# Patient Record
Sex: FEMALE | Race: WHITE
[De-identification: names, ages, dates, MRNs, and addresses within clinical notes are randomized per-mention and may not be internally consistent; named-entity substitution may affect disease eponyms.]

---

## 2017-01-01 ENCOUNTER — HOSPITAL ENCOUNTER (EMERGENCY)
Dept: HOSPITAL 62 - ER | Age: 22
Discharge: HOME | End: 2017-01-01
Payer: COMMERCIAL

## 2017-01-01 VITALS — SYSTOLIC BLOOD PRESSURE: 110 MMHG | DIASTOLIC BLOOD PRESSURE: 67 MMHG

## 2017-01-01 DIAGNOSIS — O21.2: Primary | ICD-10-CM

## 2017-01-01 DIAGNOSIS — Z3A.00: ICD-10-CM

## 2017-01-01 DIAGNOSIS — E86.0: ICD-10-CM

## 2017-01-01 DIAGNOSIS — O99.283: ICD-10-CM

## 2017-01-01 DIAGNOSIS — O26.893: ICD-10-CM

## 2017-01-01 DIAGNOSIS — R23.1: ICD-10-CM

## 2017-01-01 DIAGNOSIS — R10.12: ICD-10-CM

## 2017-01-01 LAB
%HYPO/RBC NFR BLD AUTO: SLIGHT %
ALBUMIN SERPL-MCNC: 3.7 G/DL (ref 3.5–5)
ALP SERPL-CCNC: 75 U/L (ref 38–126)
ALT SERPL-CCNC: 25 U/L (ref 9–52)
ANION GAP SERPL CALC-SCNC: 13 MMOL/L (ref 5–19)
APPEARANCE UR: (no result)
AST SERPL-CCNC: 17 U/L (ref 14–36)
BASOPHILS NFR BLD MANUAL: 0 % (ref 0–2)
BILIRUB DIRECT SERPL-MCNC: 0 MG/DL (ref 0–0.3)
BILIRUB SERPL-MCNC: 0.5 MG/DL (ref 0.2–1.3)
BILIRUB UR QL STRIP: NEGATIVE
BUN SERPL-MCNC: 10 MG/DL (ref 7–20)
CALCIUM: 8.9 MG/DL (ref 8.4–10.2)
CHLORIDE SERPL-SCNC: 106 MMOL/L (ref 98–107)
CO2 SERPL-SCNC: 22 MMOL/L (ref 22–30)
CREAT SERPL-MCNC: 0.6 MG/DL (ref 0.52–1.25)
EOSINOPHIL NFR BLD MANUAL: 0 % (ref 0–6)
ERYTHROCYTE [DISTWIDTH] IN BLOOD BY AUTOMATED COUNT: 13 % (ref 11.5–14)
GLUCOSE SERPL-MCNC: 86 MG/DL (ref 75–110)
GLUCOSE UR STRIP-MCNC: >=500 MG/DL
HCT VFR BLD CALC: 38.9 % (ref 36–47)
HGB BLD-MCNC: 12.9 G/DL (ref 12–15.5)
HGB HCT DIFFERENCE: -0.2
KETONES UR STRIP-MCNC: 80 MG/DL
LIPASE SERPL-CCNC: 78.7 U/L (ref 23–300)
MCH RBC QN AUTO: 26.7 PG (ref 27–33.4)
MCHC RBC AUTO-ENTMCNC: 33.1 G/DL (ref 32–36)
MCV RBC AUTO: 81 FL (ref 80–97)
NEUTS BAND NFR BLD MANUAL: 2 % (ref 3–5)
NITRITE UR QL STRIP: NEGATIVE
PH UR STRIP: 5 [PH] (ref 5–9)
POTASSIUM SERPL-SCNC: 4.2 MMOL/L (ref 3.6–5)
PROT SERPL-MCNC: 6.3 G/DL (ref 6.3–8.2)
PROT UR STRIP-MCNC: NEGATIVE MG/DL
RBC # BLD AUTO: 4.83 10^6/UL (ref 3.72–5.28)
SODIUM SERPL-SCNC: 140.9 MMOL/L (ref 137–145)
SP GR UR STRIP: 1.02
TOTAL CELLS COUNTED BLD: 100
UROBILINOGEN UR-MCNC: NEGATIVE MG/DL (ref ?–2)
VARIANT LYMPHS NFR BLD MANUAL: 2 % (ref 13–45)
WBC # BLD AUTO: 13.5 10^3/UL (ref 4–10.5)

## 2017-01-01 PROCEDURE — 85025 COMPLETE CBC W/AUTO DIFF WBC: CPT

## 2017-01-01 PROCEDURE — 96375 TX/PRO/DX INJ NEW DRUG ADDON: CPT

## 2017-01-01 PROCEDURE — 80053 COMPREHEN METABOLIC PANEL: CPT

## 2017-01-01 PROCEDURE — 96374 THER/PROPH/DIAG INJ IV PUSH: CPT

## 2017-01-01 PROCEDURE — 83690 ASSAY OF LIPASE: CPT

## 2017-01-01 PROCEDURE — 99284 EMERGENCY DEPT VISIT MOD MDM: CPT

## 2017-01-01 PROCEDURE — 36415 COLL VENOUS BLD VENIPUNCTURE: CPT

## 2017-01-01 PROCEDURE — 96361 HYDRATE IV INFUSION ADD-ON: CPT

## 2017-01-01 PROCEDURE — 81001 URINALYSIS AUTO W/SCOPE: CPT

## 2017-01-01 NOTE — ER DOCUMENT REPORT
ED Medical Screen (RME)





- General


Chief Complaint: Flu Symptoms


Stated Complaint: UPPER ABDOMINAL PAIN W/PREGNANCY


Mode of Arrival: Ambulatory


Information source: Patient


Notes: 


Patient is currently 7 months pregnant and complains of nausea and vomiting 

that started today.  Patient is .  Patient reports left upper quadrant 

abdominal pain.  Patient denies any lower abdominal pain.  Patient denies any 

fever, urinary symptoms, vaginal bleeding or discharge.


TRAVEL OUTSIDE OF THE [place] IN LAST 30 DAYS: No





- Related Data


Allergies/Adverse Reactions: 


 





No Known Allergies Allergy (Unverified 17 18:31)


 











Physical Exam





- Vital signs


Vitals: 





 











Temp Pulse Resp BP Pulse Ox


 


 98.0 F   120 H  16   129/67 H  100 


 


 17 17:12  17 17:12  17 17:12  17 17:12  17 17:12














- Abdominal


Inspection: Gravid female, Other - Left upper quadrant





Course





- Vital Signs


Vital signs: 





 











Temp Pulse Resp BP Pulse Ox


 


 98.0 F   120 H  16   129/67 H  100 


 


 17 17:12  17 18:30  17 17:12  17 17:12  17 17:12

## 2017-01-01 NOTE — ER DOCUMENT REPORT
ED GI/





- General


Mode of Arrival: Ambulatory


Information source: Patient


TRAVEL OUTSIDE OF THE [place] IN LAST 30 DAYS: No





- HPI


Patient complains to provider of: Abdominal pain - left side, Vomiting


Onset: This afternoon - 13:00


Location: Other - see above


Associated symptoms: Other - see above





<BALTAZAR HAMILTON - Last Filed: 17 20:07>





<MARJBEARTUR - Last Filed: 17 23:19>





- General


Chief Complaint: Flu Symptoms


Stated Complaint: UPPER ABDOMINAL PAIN W/PREGNANCY


Notes: 


21 year old pregnant female (7 months) presents to the ED complaining of 

constant vomiting since 13:00 this afternoon. Patient states that she has 

associated left sided abdominal pain prior to vomiting. Patient explains that 1 

week ago she had similar symptoms with the associated pain. Patient is vomiting 

every 30 minutes, stating that she is able to hold down water for approximately 

30 minutes before vomiting. Mother states that the patient looks more pale than 

usual. Patient denies diarrhea, coughing, or fever. Patient denies any surgical 

history and states that she takes pre-kari vitamins, but is not regular about 

them. (BALTAZAR HAMILTON)





- Related Data


Allergies/Adverse Reactions: 


 





No Known Allergies Allergy (Unverified 17 18:31)


 











Past Medical History





- General


Information source: Patient





- Social History


Smoking Status: Never Smoker


Frequency of alcohol use: None


Family History: Reviewed & Not Pertinent





- Medical History


Medical History: Negative


Surgical Hx: Negative





<BALTAZAR HAMILTON - Last Filed: 17 20:07>





Review of Systems





- Review of Systems


Constitutional: No symptoms reported.  denies: Fever


EENT: No symptoms reported


Cardiovascular: No symptoms reported


Respiratory: No symptoms reported.  denies: Cough


Gastrointestinal: See HPI, Abdominal pain - left side, Vomiting.  denies: 

Diarrhea


Genitourinary: No symptoms reported


Female Genitourinary: See HPI, Pregnant - 7 months


Musculoskeletal: No symptoms reported


Skin: See HPI, Change in color - pale


Hematologic/Lymphatic: No symptoms reported


Neurological/Psychological: No symptoms reported





<BALTAZAR HAMILTON - Last Filed: 17 20:07>





Physical Exam





- Vital signs


Interpretation: Normal





- General


General appearance: Alert


In distress: None





- HEENT


Head: Normocephalic, Atraumatic


Eyes: Normal


Extraocular movements intact: Yes


Pupils: PERRL





- Respiratory


Respiratory status: No respiratory distress


Breath sounds: Normal





- Cardiovascular


Rhythm: Regular


Heart sounds: Normal auscultation





- Abdominal


Inspection: Normal, Gravid female - 7 months


Distension: No distension


Tenderness: Tender - mild LUQ tenderness to palpation


Organomegaly: No organomegaly





- Back


Back: Normal





- Extremities


General upper extremity: Normal inspection, Normal ROM


General lower extremity: Normal inspection, Normal ROM





- Neurological


Neuro grossly intact: Yes





- Psychological


Associated symptoms: Normal affect, Normal mood





- Skin


Skin Temperature: Warm


Skin Moisture: Dry


Skin Color: Normal





<BALTAZAR HAMILTON - Last Filed: 17 20:07>





<ARTUR MCMAHAN - Last Filed: 17 23:19>





- Vital signs


Vitals: 


 











Temp Pulse Resp BP Pulse Ox


 


 98.0 F   120 H  16   129/67 H  100 


 


 17 17:12  17 17:12  17 17:12  17 17:12  17 17:12








 (BALTAZAR HAMILTON)


 (ARTUR MCMAHAN)





Course





- Laboratory


Result Diagrams: 


 17 19:10





 17 19:10





<BALTAZAR HAMILTON - Last Filed: 17 20:07>





- Laboratory


Result Diagrams: 


 17 19:10





 17 19:10





<ARTUR MCMAHAN - Last Filed: 17 23:19>





- Re-evaluation


Re-evalutation: 


17 22:55


The patient's urine is clean, shows large amount of ketones.  The elevated 

white blood cell count is probably due to all the nausea and vomiting and 

dehydration.  There is no significant abdominal pain on exam or by history.  

There is no fever.  There is no cough or URI symptoms.  There is no abdominal 

or pelvic cramping or bleeding.


At this time the patient states her nauseousness is much better even though she 

declined all nausea medication.  She is requesting some ginger ale to drink.














17 23:19


The patient drank ginger ale, tolerated it well, is anxious to go home. (ARTUR MCMAHAN)





- Vital Signs


Vital signs: 


 











Temp Pulse Resp BP Pulse Ox


 


 98.2 F   99   24 H  102/58 L  96 


 


 17 19:19  17 19:19  17 22:31  17 22:31  17 22:31








 (BALTAZAR HMAILTON)


 (ARTUR MCMAHAN)





- Laboratory


Laboratory results interpreted by me: 


 











  17





  19:10 21:58


 


WBC  13.5 H 


 


MCH  26.7 L 


 


Seg Neuts % (Manual)  92 H 


 


Band Neutrophils %  2 L 


 


Lymphocytes % (Manual)  2 L 


 


Abs Neuts (Manual)  12.7 H 


 


Abs Lymphs (Manual)  0.4 L 


 


Urine Glucose (UA)   >=500 H


 


Urine Ketones   80 H


 


Urine Ascorbic Acid   40 H











 (ARTUR MCMAHAN)





Discharge





<BALTAZAR HAMILTON - Last Filed: 17 20:07>





<ARTUR MCMAHAN - Last Filed: 17 23:19>





- Discharge


Clinical Impression: 


 Dehydration, Third trimester pregnancy at less than 36 weeks





Nausea & vomiting


Qualifiers:


 Vomiting type: unspecified Vomiting Intractability: non-intractable Qualified 

Code(s): R11.2 - Nausea with vomiting, unspecified





Condition: Stable


Disposition: HOME, SELF-CARE


Additional Instructions: 


Nausea or Vomiting, Nonspecific:





     Vomiting (or nausea without vomiting) can be caused by many different 

problems. Of course, it can mean that something's wrong with the stomach, such 

as "stomach flu," ulcers, or inflammation. But it can also be a symptom of a 

problem that has nothing to do with the stomach or intestines. Vomiting is 

common with severe headaches, earaches, and tonsillitis. We see it with 

pneumonia or heart attacks. Drugs can cause nausea. Many abdominal problems 

cause vomiting; for example, gallstones, kidney stones, pancreatitis, and 

intestinal obstruction (blocked bowels).


     In most cases, curing the vomiting depends on fixing the problem that 

caused it. For temporary relief, we may use an anti-nausea medicine. For home 

use, we can prescribe suppositories, chewable pills, pills that dissolve in the 

mouth, or liquid anti-nausea drugs. If the vomiting seems to be caused by a 

problem in the stomach, acid-suppressing drugs may be prescribed as well.


     It's important to avoid dehydration. Sip clear liquids. Take increasing 

amounts of fluid over the first 24 hours. Then start small amounts of bland 

foods (such as dry toast, applesauce, mashed potato). Avoid aspirin, tobacco, 

and alcohol. Gradually resume your usual diet.


     If the vomiting worsens, if the problem that's making you vomit worsens, 

or if there's evidence of bleeding in the stomach (such as black, tarry stool, 

bloody or black vomit, or lightheadedness), you should return immediately. Call 

your doctor if you aren't improved in 24 to 36 hours.











TAKE THE MEDICATION AS PRESCRIBED FOR NAUSEA IF NEEDED.


DRINK SMALL SIPS OF COOL CLEAR LIQUIDS TODAY.


FOLLOW UP WITH YOUR DOCTOR THIS WEEK FOR RECHECK IF NOT IMPROVING.





RETURN TO THE EMERGENCY ROOM IF ANY NEW OR WORSENING SYMPTOMS.








Prescriptions: 


Promethazine HCl [Phenergan 25 mg Tablet] 25 mg PO Q4 PRN #12 tablet


 PRN Reason: For Nausea/Vomiting


Referrals: 


ANIKET CERON MD [Primary Care Provider] - Follow up in 3-5 days


Lauraibe Attestation: 





17 23:13


I personally performed the services described in the documentation, reviewed 

and edited the documentation which was dictated to the scribe in my presence, 

and it accurately records my words and actions. (ARTUR MCMAHAN)





Scribe Documentation





- Scribe


Written by Inderjit:: Inderjit Rodriguez, 2017 19:53


acting as scribe for :: Marj





<BALTAZAR HAMILTON - Last Filed: 17 20:07>

## 2017-02-15 ENCOUNTER — HOSPITAL ENCOUNTER (OUTPATIENT)
Dept: HOSPITAL 62 - LC | Age: 22
Discharge: HOME | End: 2017-02-15
Attending: OBSTETRICS & GYNECOLOGY
Payer: MEDICAID

## 2017-02-15 DIAGNOSIS — Z3A.34: ICD-10-CM

## 2017-02-15 DIAGNOSIS — O47.03: Primary | ICD-10-CM

## 2017-02-15 LAB
APPEARANCE UR: (no result)
BARBITURATES UR QL SCN: NEGATIVE
BILIRUB UR QL STRIP: NEGATIVE
GLUCOSE UR STRIP-MCNC: NEGATIVE MG/DL
KETONES UR STRIP-MCNC: 20 MG/DL
METHADONE UR QL SCN: NEGATIVE
NITRITE UR QL STRIP: NEGATIVE
PCP UR QL SCN: NEGATIVE
PH UR STRIP: 6 [PH] (ref 5–9)
PROT UR STRIP-MCNC: NEGATIVE MG/DL
SP GR UR STRIP: 1
URINE OPIATES LOW: NEGATIVE
UROBILINOGEN UR-MCNC: NEGATIVE MG/DL (ref ?–2)

## 2017-02-15 PROCEDURE — 81001 URINALYSIS AUTO W/SCOPE: CPT

## 2017-02-15 PROCEDURE — 82962 GLUCOSE BLOOD TEST: CPT

## 2017-02-15 PROCEDURE — 4A1HXCZ MONITORING OF PRODUCTS OF CONCEPTION, CARDIAC RATE, EXTERNAL APPROACH: ICD-10-PCS | Performed by: OBSTETRICS & GYNECOLOGY

## 2017-02-15 PROCEDURE — 59025 FETAL NON-STRESS TEST: CPT

## 2017-02-15 PROCEDURE — 80307 DRUG TEST PRSMV CHEM ANLYZR: CPT

## 2017-02-15 NOTE — NON STRESS TEST REPORT
=================================================================

Non Stress Test

=================================================================

Datetime Report Generated by CPN: 02/15/2017 17:11

   

   

=================================================================

DEMOGRAPHIC

=================================================================

   

EGA NST:  34.4

   

=================================================================

INDICATION

=================================================================

   

Indication for Study:  Other

Indication for Study (NST) Other:  LC

   

=================================================================

MONITORING

=================================================================

   

Monitor Explained:  Monitor Explained; Test Explained; Patient

   Verbalized Understanding

Time on Monitor:  02/15/2017 15:37

Time off Monitor:  02/15/2017 16:24

NST Duration:  47

   

=================================================================

NST INTERVENTIONS

=================================================================

   

NST Interventions:  PO Hydration

Physician Notified NST:  A. Emmel CNM

BABY A:  [id number]

   

=================================================================

BABY A

=================================================================

   

Fetal Movement :  Present

Contraction Frequency :  rare

FHR Baseline :  125

Accelerations :  15X15

Decelerations :  None

Variability :  Moderate 6-25bpm

NST Review:  Meets Criteria for Reactive NST

NST Review and Verified By :  ANU Doe RN

NST Results:  Reactive

   

=================================================================

NST REPORT

=================================================================

   

Report Trigger:  Send Report

## 2017-02-15 NOTE — L&D FLOW SHEET
=================================================================

LD Flowsheet

=================================================================

Datetime Report Generated by CPN: 02/15/2017 16:00

   

   

=================================================================

Datetime: 02/15/2017 15:46

=================================================================

   

 Bedside Blood Glucose:  90 (Geneva Marlatt, RN)

   

=================================================================

Datetime: 02/15/2017 15:41

=================================================================

   

   

=================================================================

Communication

=================================================================

   

 Communication:  Provider Orders Received; Call/Page Placed to Provider

   (Geneva Christianson RN)

 Provider Notified (Name):  ANU Thompson ELKIN (Geneva Christianson RN)

 Notification Reason:  Status Update; Fetal Status; Membrane Status;

   Uterine Activity; Maternal Vital Sign Change (Geneva Christianson RN)

 Communication Comments:  Notified provider of patient's complaints of 

   abdominal pain, dizziness, and cold sweats after stnading up to help

   a patient at work and the fact that pt hasn't eaten since 8am but at

   crackers on the way here.  Received order to check bedside glucose

   and give her crackers and peanut butter. (Geneva Christianson RN)

   

=================================================================

Datetime: 02/15/2017 15:40

=================================================================

   

   

=================================================================

Maternal Assessment

=================================================================

   

 Level of Consciousness:  Fully Conscious (Geneva Slaterlatalisha, RN)

 DTR's/Clonus:  DTRs 2+; No Clonus (Geneva Marlatalisha, RN)

 Headache:  Denies (Geneva Christianson, RN)

 Breath Sounds, Left:  Clear and Equal (Geneva Slaterlatalisha, RN)

 Breath Sounds, Right:  Clear and Equal (Geneva Jennifertalisha, RN)

 Nausea/Vomiting:  Denies (Geneva Marlatalisha RN)

 RUQ Epigastric Pain:  Denies (Geneva Christianson, RN)

   

=================================================================

Datetime: 02/15/2017 15:35

=================================================================

   

   

=================================================================

Vital Signs

=================================================================

   

 NBP Sys/Zandra/Mean (mmHg):  94 (QS system process)

:  57 (QS system process)

:  68 (QS system process)

 Pulse:  74 (QS system process)

## 2017-03-23 ENCOUNTER — HOSPITAL ENCOUNTER (OUTPATIENT)
Dept: HOSPITAL 62 - LC | Age: 22
Discharge: HOME | End: 2017-03-23
Attending: OBSTETRICS & GYNECOLOGY
Payer: MEDICAID

## 2017-03-23 DIAGNOSIS — O47.1: Primary | ICD-10-CM

## 2017-03-23 DIAGNOSIS — Z3A.39: ICD-10-CM

## 2017-03-23 LAB
APPEARANCE UR: (no result)
BARBITURATES UR QL SCN: NEGATIVE
BILIRUB UR QL STRIP: NEGATIVE
GLUCOSE UR STRIP-MCNC: NEGATIVE MG/DL
KETONES UR STRIP-MCNC: NEGATIVE MG/DL
METHADONE UR QL SCN: NEGATIVE
NITRITE UR QL STRIP: NEGATIVE
PCP UR QL SCN: NEGATIVE
PH UR STRIP: 6 [PH] (ref 5–9)
PROT UR STRIP-MCNC: 30 MG/DL
SP GR UR STRIP: 1.01
URINE OPIATES LOW: NEGATIVE
UROBILINOGEN UR-MCNC: NEGATIVE MG/DL (ref ?–2)

## 2017-03-23 PROCEDURE — 59025 FETAL NON-STRESS TEST: CPT

## 2017-03-23 PROCEDURE — 81005 URINALYSIS: CPT

## 2017-03-23 PROCEDURE — 4A1HXCZ MONITORING OF PRODUCTS OF CONCEPTION, CARDIAC RATE, EXTERNAL APPROACH: ICD-10-PCS | Performed by: OBSTETRICS & GYNECOLOGY

## 2017-03-23 PROCEDURE — 80307 DRUG TEST PRSMV CHEM ANLYZR: CPT

## 2017-03-23 NOTE — L&D DISCHARGE SUMMARY
=================================================================

OB Discharge Summary

=================================================================

Datetime Report Generated by CPN: 03/23/2017 10:45

   

   

=================================================================

DISCHARGE DIAGNOSIS

=================================================================

   

Diagnosis/Symptoms:  False Labor

Diagnoses/Symptoms Other:  IUP at 39.5 weeks, reactive NST, normal

   bloody show, not in labor

Gestation:  39.5

Number of Babies in Womb:  1

Parity:  0

   

=================================================================

DIET/ACTIVITY/RESTRICTIONS

=================================================================

   

Diet:  Regular

Activity:  Normal Activity

   

=================================================================

TEACHING/INSTRUCTIONS/REFERRALS

=================================================================

   

Instructions Given To:  Patient

Instructions Understood:  Patient Verbalized Understanding; Support

   Person Verbalized Understanding

Referrals:  None

Educational Materials- Other:  term labor, the labor process

   

=================================================================

DISCHARGE INFORMATION

=================================================================

   

Discharged AMA:  No

Discharge Date/Time:  03/23/2017 09:16

Discharged To:  Home

Discharge Provider Name:  LUCIEN Grider CNM

Accompanied By:  

Discharge Method:  Ambulatory

Condition:  Stable

   

=================================================================

FOLLOW UP INFORMATION

=================================================================

   

Follow Up With:  [facility]

Follow Up On:  As Scheduled

Follow Up Phone Number:  [facility] - [phone]

Comments:  Instructed  patient to eat small frequent meals to avoid

   feeling like that again. Instructed patient on how to do Kick

   Counts. Patient verbalized understanding and denied any questions.

## 2017-03-23 NOTE — ANTEPARTUM DISCHARGE SUMMARY
=================================================================

Antepartum DC

=================================================================

Datetime Report Generated by CPN: 03/23/2017 10:45

   

   

=================================================================

DIET/ACTIVITY/RESTRICTIONS

=================================================================

   

Diet:  Regular    (03/23/2017 09:00:ERLINDA Reinoso)

Activity:  Normal Activity    (03/23/2017 09:00:Cherelle Huynh RNC)

   

=================================================================

TEACHING/INSTRUCTIONS/REFERRALS

=================================================================

   

Instructions Given To:  Patient    (03/23/2017 09:00:ERLINDA Reinoso)

Instructions Understood:  Patient Verbalized Understanding; Support

   Person Verbalized Understanding    (03/23/2017 09:00:ERLINDA Reinoso)

Referrals:  None    (03/23/2017 09:00:ERLINDA Reinoso)

Educational Materials- Other:  term labor, the labor process   

   (03/23/2017 09:00:ERLINDA Reinoso)

   

=================================================================

DISCHARGE INFORMATION

=================================================================

   

Discharged AMA:  No    (03/23/2017 09:00:ERLINDA Reinoso)

Discharge Date/Time:  03/23/2017 09:16    (03/23/2017 09:00:ERLINDA Reinoso)

Discharged To:  Home    (03/23/2017 09:00:ERLINDA Reinoso)

Discharge Provider Name:  LUCIEN Grider CNM    (03/23/2017 09:00:ERLINDA Reinoso)

Accompanied By:      (03/23/2017 09:00:ERLINDA Reinoso)

Discharge Method:  Ambulatory    (03/23/2017 09:00:ERLINDA Reinoso)

Condition:  Stable    (03/23/2017 09:00:ERLINDA Reinoso)

   

=================================================================

FOLLOW UP INFORMATION

=================================================================

   

Follow Up With:  [facility]    (03/23/2017

   09:00:ERLINDA Reinoso)

Follow Up On:  As Scheduled    (03/23/2017 09:00:ERLINDA Reinoso)

Follow Up Phone Number:  [facility] - [phone]

   (03/23/2017 09:00:ERLINDA Reinoso)

## 2017-03-23 NOTE — L&D CURRENT ADMISSION
=================================================================

Current Admit

=================================================================

Datetime Report Generated by CPN: 03/23/2017 10:45

   

   

=================================================================

ADMISSION INFORMATION

=================================================================

   

Chief Complaint:  Vaginal Bleeding    (03/23/2017 08:37:ERLINDA Reinoso)

Chief Complaint:  Dizziness; Other    (02/15/2017 15:40:Geneva Christianson RN)

## 2017-03-23 NOTE — L&D ADMISSION ASSESSMENT
=================================================================

LD ADM ASMT

=================================================================

Datetime Report Generated by CPN: 03/23/2017 10:45

   

   

=================================================================

PATIENT ASSESSMENT

=================================================================

   

Assessment Type:  Triage    (03/23/2017 08:37:Cherellegertrudis Huynh, RNC)

   

=================================================================

WEIGHT

=================================================================

   

Weight (lb):  255    (03/23/2017 08:25:QS system process)

Weight (kg):  115.9    (03/23/2017 08:25:QS system process)

BMI:  41.2    (03/23/2017 08:25:QS system process)

   

=================================================================

PAIN

=================================================================

   

Pain Scale:  0    (03/23/2017 08:37:ERLINDA Reinoso)

   

=================================================================

NEURO

=================================================================

   

Level of Consciousness:  Fully Conscious    (03/23/2017 08:37:ERLINDA Reinoso)

DTR's/Clonus:  DTRs 2+; No Clonus    (03/23/2017 08:37:Cherelle Huynh RN)

Headache:  Denies    (03/23/2017 08:37:ERLINDA Reinoso)

Dizziness:  No    (03/23/2017 08:37:ERLINDA Reinoso)

Blurred Vision:  No    (03/23/2017 08:37:Cherelle Huynh RN)

Extremity Numbness/Tingling :  None    (03/23/2017 08:37:ERLINDA Reinoso)

Extremity Movement:  Full Range of Motion    (03/23/2017

   08:37:Cherelle Huynh RN)

   

=================================================================

CARDIOVASCULAR

=================================================================

   

Heart Rhythm:  Regular    (03/23/2017 08:37:ERLINDA Reinoso)

Nailbeds:  Pink    (03/23/2017 08:37:ERLINDA Reinoso)

Capillary Refill:  Less than 3 Seconds    (03/23/2017 08:37:ERLINDA Reinoso)

Lower Extremities Edema:  None    (03/23/2017 08:37:ERLINDA Reinoso)

Lower Extremities Edema Degree:  None    (03/23/2017 08:37:ERLINDA Reinoso)

Upper Extremities Edema:  None    (03/23/2017 08:37:ERLINDA Reinoso)

Upper Extremities Edema Degree:  None    (03/23/2017 08:37:ERLINDA Reinoso)

Facial Edema:  None    (03/23/2017 08:37:ERLINDA Reinoso)

   

=================================================================

RESPIRATORY

=================================================================

   

Respiratory Effort:  Unlabored; Regular Rhythm; Equal Expansion   

   (03/23/2017 08:37:ERLINDA Reinoso)

Breath Sounds, Left:  Clear and Equal    (03/23/2017 08:37:ERLINDA Reinoso)

Breath Sounds, Right:  Clear and Equal    (03/23/2017 08:37:ERLINDA Reinoso)

Cough Productivity:  None    (03/23/2017 08:37:ERLINDA Reinoso)

   

=================================================================

GASTROINTESTINAL

=================================================================

   

Nausea/Vomiting:  Denies    (03/23/2017 08:37:ERLINDA Reinoso)

Bowel Sounds:  Normoactive; All Quadrants    (03/23/2017

   08:37:ERLINDA Reinoso)

RUQ Epigastric Pain:  Denies    (03/23/2017 08:37:ERLINDA Reinoso)

   

=================================================================

GENITOURINARY

=================================================================

   

Bladder:  Nondistended    (03/23/2017 08:37:ERLINDA Reinoso)

Frequency of Urination:  No    (03/23/2017 08:37:ERLINDA Reinoso)

Urination Burning:  No    (03/23/2017 08:37:ERLINDA Reinoso)

CVA Tenderness:  No    (03/23/2017 08:37:ERLINDA Reinoso)

Vaginal Bleeding:  None    (03/23/2017 08:37:ERLINDA Reinoso)

Vaginal Discharge Color:  N/A    (03/23/2017 08:37:ERLINDA Reinoso)

   

=================================================================

INTEGUMENTARY

=================================================================

   

Skin Color:  Normal for Race    (03/23/2017 08:37:ERLINDA Reinoso)

Skin Temperature:  Warm    (03/23/2017 08:37:ERLINDA Reinoso)

Skin Moisture:  Dry    (03/23/2017 08:37:ERLINDA Reinoso)

Body Piercings/Tattoos:  Tongue, ears, and belly button pierced   

   (03/23/2017 08:37:ERLINDA Reinoso)

   

=================================================================

JOLENE SKIN ASSESSMENT

=================================================================

   

Jolene Scale Sensory Perception:  No Impairment- Responds to verbal

   commands.  Has no sensory deficit which would limit ability to feel

   or voice pain or discomfort    (03/23/2017 08:37:ERLINDA Reinoso)

Jolene Scale Moisture:  Rarely Moist- Skin is usually dry.  Linen only

   requires changing at routine intervals    (03/23/2017

   08:37:ERLINDA Reinoso)

Jolene Scale Activity:  Walks Frequently- Walks outside the room at

   least twice a day and inside room at least every 2 hours during the

   day.    (03/23/2017 08:37:ERLINDA Reinoso)

Jolene Scale Mobility:  No Limitations- Makes major and frequent

   changes in position without assistance    (03/23/2017

   08:37:ERLINDA Reinoso)

Jolene Scale Nutrition:  Excellent- Eats most of every meal.  Never

   refuses a meal.  Usually eats a total of 4 or more servings of meat

   and dairy products.  Occasionally eats between meals.  Does not

   require supplementation    (03/23/2017 08:37:ERLINDA Reinoso)

Jolene Scale Friction and Shear:  No Apparent Problem- Moves in bed and

   in chair independently and has sufficient muscle strength to lift up

   completely during move.  Maintains good position in bed or chair at

   all times    (03/23/2017 08:37:ERLINDA Reinoso)

Jolene Scale Total:  23    (03/23/2017 08:37:QS system process)

Jolene Scale Risk:  No Risk of Pressure Ulcer Noted at this Time   

   (03/23/2017 08:37:QS system process)

   

=================================================================

SUPPORT

=================================================================

   

Family Support:  Significant Other supportive, at bedside frequently   

   (03/23/2017 08:37:ERLINDA Reinoso)

Emotional State:  Calm/Relaxed    (03/23/2017 08:37:ERLINDA Reinoso)

   

=================================================================

SAFETY

=================================================================

   

Call Mishra Within Reach:  Yes    (03/23/2017 08:37:ERLINDA Reinoso)

Side Rails Up:  Yes    (03/23/2017 08:37:ERLINDA Reinoso)

Bed Wheels Locked:  Yes    (03/23/2017 08:37:ERLINDA Reinoso)

Arm Bands Present:  Yes    (03/23/2017 08:37:ERLINDA Reinoso)

   

=================================================================

FALL SCREEN

=================================================================

   

Fall Risk History of Falling:  (0) No    (03/23/2017 08:37:ERLINDA Reinoso)

Fall Risk Secondary Diagnosis:  (0) No    (03/23/2017 08:37:ERLINDA Reinoso)

Fall Risk Ambulatory Aid:  (0) None/Bedrest/Wheelchair/Nurse Assist   

   (03/23/2017 08:37:ERLINDA Reinoso)

Fall Risk IV Therapy:  (0) No    (03/23/2017 08:37:ERLINDA Reinoso)

Fall Risk Gait:  (0) Normal/Bedrest/Immobile    (03/23/2017

   08:37:ERLINDA Reinoso)

Fall Risk Mental Status:  (0) Oriented to Own Ability    (03/23/2017

   08:37:ERLINDA Reinoso)

Fall Risk Score:  0    (03/23/2017 08:37:QS system process)

Fall Risk Score Definition:  No Risk: No action required    (03/23/2017

   08:37:QS system process)

## 2017-03-23 NOTE — L&D FLOW SHEET
=================================================================

LD Flowsheet

=================================================================

Datetime Report Generated by CPN: 03/23/2017 10:45

   

   

=================================================================

Datetime: 03/23/2017 09:16

=================================================================

   

   

=================================================================

Communication

=================================================================

   

 Additional Nursing Comments:  Pt physically left L_D ambulatory in

   stable condition with  at side and no complaints or needs at

   this time. (Cherelle Lino, RNC)

   

=================================================================

Datetime: 03/23/2017 09:05

=================================================================

   

   

=================================================================

Fetal Assessment A

=================================================================

   

 Comments:  Monitors removed from abdomen, pt up to BR to change

   clothes for D/C home. (Cherelle Lino, RNC)

   

=================================================================

Datetime: 03/23/2017 08:37

=================================================================

   

   

=================================================================

Vital Signs

=================================================================

   

 NBP Sys/Zandra/Mean (mmHg):  124 (QS system process)

:  77 (QS system process)

:  96 (QS system process)

 Pulse:  87 (QS system process)

   

=================================================================

Pain

=================================================================

   

 Pain Scale:  0 (Cherelle Lino, RNC)

   

=================================================================

Vaginal Exam

=================================================================

   

 Vaginal Bleeding:  Normal Show (Annotations: Pt states she is not sure

   if the bleeding is normal or not.) (Cherelle Lino, RNC)

   

=================================================================

Maternal Assessment

=================================================================

   

 Level of Consciousness:  Fully Conscious (Cherelle Huynh, RNC)

 DTR's/Clonus:  DTRs 2+; No Clonus (Cherelle Huynh, RNC)

 Headache:  Denies (Cherelle Huynh, RNC)

 Breath Sounds, Left:  Clear and Equal (Cherelle Lino, RNC)

 Breath Sounds, Right:  Clear and Equal (Cherelle Lino, RNC)

 Nausea/Vomiting:  Denies (Cherelle Huynh, RNC)

 RUQ Epigastric Pain:  Denies (Cherelle Lino, RNC)

   

=================================================================

Teaching

=================================================================

   

 Instructional Method:  Verbal; Patient Instructed; Family/Support

   Person Instructed; Verbalized Understanding (Cherelle Huynh RNC)

 Plan of Care:  Plan of Care Discussed; Vaginal Delivery; Labor

   (Cherelle Huynh RNC)

 Unit Routine:  [place] to Room; Call Mishra; Bed; Unit Personnel;

   Handwashing; Fetal Monitoring; Bathroom Privileges (Cherelle Huynh RNC)

 Labor/Induction:  Labor Stages; Augmentation; Induction; Fetal

   Interventions; Activity (Cherelle Huynh RNC)

 Pregnancy Related:  Common Discomforts of Pregnancy; Maternal Physical

   Changes; Hydration; Activity and Rest (Cherelle Huynh, RNC)

   

=================================================================

Datetime: 03/23/2017 08:20

=================================================================

   

   

=================================================================

Communication

=================================================================

   

 Additional Nursing Comments:  Pt arrived to L_D via wheelchair with

   complaints of vaginal bleeding. (ERLINDA Reinoso)

## 2017-03-25 ENCOUNTER — HOSPITAL ENCOUNTER (INPATIENT)
Dept: HOSPITAL 62 - LC | Age: 22
LOS: 3 days | Discharge: HOME | End: 2017-03-28
Attending: OBSTETRICS & GYNECOLOGY | Admitting: OBSTETRICS & GYNECOLOGY
Payer: MEDICAID

## 2017-03-25 DIAGNOSIS — Z91.018: ICD-10-CM

## 2017-03-25 DIAGNOSIS — D64.9: ICD-10-CM

## 2017-03-25 DIAGNOSIS — Z87.891: ICD-10-CM

## 2017-03-25 DIAGNOSIS — Z3A.40: ICD-10-CM

## 2017-03-25 LAB
A1 MICROGLOB PLACENTAL VAG QL: POSITIVE
APPEARANCE UR: CLEAR
BARBITURATES UR QL SCN: NEGATIVE
BASOPHILS # BLD AUTO: 0 10^3/UL (ref 0–0.2)
BASOPHILS NFR BLD AUTO: 0.1 % (ref 0–2)
BILIRUB UR QL STRIP: NEGATIVE
EOSINOPHIL # BLD AUTO: 0 10^3/UL (ref 0–0.6)
EOSINOPHIL NFR BLD AUTO: 0.1 % (ref 0–6)
ERYTHROCYTE [DISTWIDTH] IN BLOOD BY AUTOMATED COUNT: 14.3 % (ref 11.5–14)
GLUCOSE UR STRIP-MCNC: NEGATIVE MG/DL
HCT VFR BLD CALC: 34.8 % (ref 36–47)
HGB BLD-MCNC: 11.5 G/DL (ref 12–15.5)
HGB HCT DIFFERENCE: -0.3
KETONES UR STRIP-MCNC: NEGATIVE MG/DL
LYMPHOCYTES # BLD AUTO: 1.2 10^3/UL (ref 0.5–4.7)
LYMPHOCYTES NFR BLD AUTO: 11.1 % (ref 13–45)
MCH RBC QN AUTO: 26.2 PG (ref 27–33.4)
MCHC RBC AUTO-ENTMCNC: 33 G/DL (ref 32–36)
MCV RBC AUTO: 79 FL (ref 80–97)
METHADONE UR QL SCN: NEGATIVE
MONOCYTES # BLD AUTO: 0.7 10^3/UL (ref 0.1–1.4)
MONOCYTES NFR BLD AUTO: 6.6 % (ref 3–13)
NEUTROPHILS # BLD AUTO: 9.2 10^3/UL (ref 1.7–8.2)
NEUTS SEG NFR BLD AUTO: 82.1 % (ref 42–78)
NITRITE UR QL STRIP: NEGATIVE
PCP UR QL SCN: NEGATIVE
PH UR STRIP: 6 [PH] (ref 5–9)
PROT UR STRIP-MCNC: NEGATIVE MG/DL
RBC # BLD AUTO: 4.4 10^6/UL (ref 3.72–5.28)
SP GR UR STRIP: 1.01
URINE OPIATES LOW: NEGATIVE
UROBILINOGEN UR-MCNC: NEGATIVE MG/DL (ref ?–2)
WBC # BLD AUTO: 11.2 10^3/UL (ref 4–10.5)

## 2017-03-25 PROCEDURE — 86900 BLOOD TYPING SEROLOGIC ABO: CPT

## 2017-03-25 PROCEDURE — 81005 URINALYSIS: CPT

## 2017-03-25 PROCEDURE — 85027 COMPLETE CBC AUTOMATED: CPT

## 2017-03-25 PROCEDURE — 86901 BLOOD TYPING SEROLOGIC RH(D): CPT

## 2017-03-25 PROCEDURE — 85025 COMPLETE CBC W/AUTO DIFF WBC: CPT

## 2017-03-25 PROCEDURE — 86592 SYPHILIS TEST NON-TREP QUAL: CPT

## 2017-03-25 PROCEDURE — 80307 DRUG TEST PRSMV CHEM ANLYZR: CPT

## 2017-03-25 PROCEDURE — 59025 FETAL NON-STRESS TEST: CPT

## 2017-03-25 PROCEDURE — 4A1HXCZ MONITORING OF PRODUCTS OF CONCEPTION, CARDIAC RATE, EXTERNAL APPROACH: ICD-10-PCS | Performed by: OBSTETRICS & GYNECOLOGY

## 2017-03-25 PROCEDURE — 84112 EVAL AMNIOTIC FLUID PROTEIN: CPT

## 2017-03-25 PROCEDURE — 86850 RBC ANTIBODY SCREEN: CPT

## 2017-03-25 PROCEDURE — 36415 COLL VENOUS BLD VENIPUNCTURE: CPT

## 2017-03-25 NOTE — L&D FLOW SHEET
=================================================================

LD Flowsheet

=================================================================

Datetime Report Generated by CPN: 03/25/2017 20:00

   

   

=================================================================

Datetime: 03/25/2017 19:50

=================================================================

   

 Patient Position/Activity:  Left Lateral (Adry Ledgerwood, RN)

   

=================================================================

Datetime: 03/25/2017 19:43

=================================================================

   

 NBP Sys/Zandra/Mean (mmHg):  122 (QS system process)

:  89 (QS system process)

:  101 (QS system process)

 Pulse:  93 (QS system process)

 LaborFlag:  Labor (QS system process)

   

=================================================================

Datetime: 03/25/2017 19:24

=================================================================

   

 Pain Scale:  0 (Adry Coronado, RN)

 Pain Presence:  None/Denies (Adry Coronado, RN)

 Vaginal Bleeding:  None (Adry Coronado, RN)

 Level of Consciousness:  Fully Conscious (Adry Coronado, RN)

 DTR's/Clonus:  DTRs 2+; No Clonus (Adry Coronado, RN)

 Headache:  Denies (Adry Coronado, RN)

 Breath Sounds, Left:  Clear and Equal (Adry Coronado, RN)

 Breath Sounds, Right:  Clear and Equal (Adry Calderongerdayan, RN)

 Nausea/Vomiting:  Denies (Adry Coronado, RN)

 RUQ Epigastric Pain:  Denies (Adry Coronado, RN)

 Instructional Method:  Demo; Verbal; Patient Instructed (Adry Coronado, RN)

 Plan of Care:  Plan of Care Discussed; Vaginal Delivery (Adry Coronado, RN)

 LaborFlag:  Labor (QS system process)

   

=================================================================

Datetime: 03/25/2017 19:23

=================================================================

   

 NBP Sys/Zandra/Mean (mmHg):  141 (QS system process)

:  73 (QS system process)

:  101 (QS system process)

 Pulse:  107 (QS system process)

 LaborFlag:  Labor (QS system process)

   

=================================================================

Datetime: 03/25/2017 19:22

=================================================================

   

 NBP Sys/Zandra/Mean (mmHg):  134 (QS system process)

:  88 (QS system process)

:  107 (QS system process)

 Pulse:  105 (QS system process)

 LaborFlag:  Labor (QS system process)

   

=================================================================

Datetime: 03/25/2017 19:21

=================================================================

   

 Communication:  Report Given to @ ELLIE Coronado RN (Grace Phelps RN)

 Communication Comments:  report received from JOSE Phelps RN at

   bedside (Adry Coronado RN)

   

=================================================================

Datetime: 03/25/2017 19:20

=================================================================

   

 NBP Sys/Zandra/Mean (mmHg):  128 (QS system process)

:  75 (QS system process)

:  96 (QS system process)

 Pulse:  103 (QS system process)

 LaborFlag:  Labor (QS system process)

   

=================================================================

Datetime: 03/25/2017 19:19

=================================================================

   

 NBP Sys/Zandra/Mean (mmHg):  133 (QS system process)

:  63 (QS system process)

:  90 (QS system process)

 Pulse:  88 (QS system process)

 LaborFlag:  Labor (QS system process)

   

=================================================================

Datetime: 03/25/2017 19:18

=================================================================

   

 NBP Sys/Zandra/Mean (mmHg):  138 (QS system process)

:  63 (QS system process)

:  90 (QS system process)

 Pulse:  106 (QS system process)

 LaborFlag:  Labor (QS system process)

   

=================================================================

Datetime: 03/25/2017 19:17

=================================================================

   

 NBP Sys/Zandra/Mean (mmHg):  123 (QS system process)

:  65 (QS system process)

:  89 (QS system process)

 Pulse:  96 (QS system process)

 LaborFlag:  Labor (QS system process)

   

=================================================================

Datetime: 03/25/2017 19:16

=================================================================

   

 NBP Sys/Zandra/Mean (mmHg):  133 (QS system process)

:  69 (QS system process)

:  100 (QS system process)

 Pulse:  101 (QS system process)

 LaborFlag:  Labor (QS system process)

   

=================================================================

Datetime: 03/25/2017 19:15

=================================================================

   

 NBP Sys/Zandra/Mean (mmHg):  145 (QS system process)

:  73 (QS system process)

:  105 (QS system process)

 Pulse:  89 (QS system process)

 Dilatation (cm):  4.0 (Nellie Naidu RN)

 Effacement (%):  90 (Nellie Naidu RN)

 Station:  0 (Nellie Naidu RN)

 Exam by:  ELENO Phelps RN (Nellie Naidu RN)

 LaborFlag:  Labor (QS system process)

   

=================================================================

Datetime: 03/25/2017 19:14

=================================================================

   

 NBP Sys/Zandra/Mean (mmHg):  143 (QS system process)

:  65 (QS system process)

:  93 (QS system process)

 Pulse:  107 (QS system process)

 LaborFlag:  Labor (QS system process)

   

=================================================================

Datetime: 03/25/2017 19:12

=================================================================

   

 NBP Sys/Zandra/Mean (mmHg):  156 (QS system process)

:  67 (QS system process)

:  97 (QS system process)

 Pulse:  94 (QS system process)

 LaborFlag:  Labor (QS system process)

   

=================================================================

Datetime: 03/25/2017 19:11

=================================================================

   

 NBP Sys/Zandra/Mean (mmHg):  145 (QS system process)

:  70 (QS system process)

:  98 (QS system process)

 Pulse:  103 (QS system process)

 LaborFlag:  Labor (QS system process)

   

=================================================================

Datetime: 03/25/2017 19:10

=================================================================

   

 NBP Sys/Zandra/Mean (mmHg):  137 (QS system process)

:  62 (QS system process)

:  93 (QS system process)

 Pulse:  101 (QS system process)

 LaborFlag:  Labor (QS system process)

   

=================================================================

Datetime: 03/25/2017 19:09

=================================================================

   

 NBP Sys/Zandra/Mean (mmHg):  145 (QS system process)

:  66 (QS system process)

:  95 (QS system process)

 Pulse:  92 (QS system process)

 LaborFlag:  Labor (QS system process)

   

=================================================================

Datetime: 03/25/2017 19:07

=================================================================

   

 NBP Sys/Zandra/Mean (mmHg):  142 (QS system process)

:  75 (QS system process)

:  103 (QS system process)

 Pulse:  97 (QS system process)

 LaborFlag:  Labor (QS system process)

   

=================================================================

Datetime: 03/25/2017 19:06

=================================================================

   

 NBP Sys/Zandra/Mean (mmHg):  142 (QS system process)

:  74 (QS system process)

:  103 (QS system process)

 Pulse:  93 (QS system process)

 Epidural Procedure Other:  Pump Started (Grace Phelps RN)

 LaborFlag:  Labor (QS system process)

   

=================================================================

Datetime: 03/25/2017 19:05

=================================================================

   

 NBP Sys/Zandra/Mean (mmHg):  130 (QS system process)

:  69 (QS system process)

:  93 (QS system process)

 Pulse:  90 (QS system process)

 LaborFlag:  Labor (QS system process)

   

=================================================================

Datetime: 03/25/2017 19:04

=================================================================

   

 NBP Sys/Zandra/Mean (mmHg):  145 (QS system process)

:  65 (QS system process)

:  102 (QS system process)

 Pulse:  90 (QS system process)

 Epidural Procedure:  Loading Dose (Grace Phelps RN)

 LaborFlag:  Labor (QS system process)

   

=================================================================

Datetime: 03/25/2017 19:02

=================================================================

   

 NBP Sys/Zandra/Mean (mmHg):  153 (QS system process)

:  96 (QS system process)

:  116 (QS system process)

 Pulse:  91 (QS system process)

 LaborFlag:  Labor (QS system process)

   

=================================================================

Datetime: 03/25/2017 19:01

=================================================================

   

 NBP Sys/Zandra/Mean (mmHg):  151 (QS system process)

:  93 (QS system process)

:  116 (QS system process)

 Pulse:  92 (QS system process)

 Pulse:  85 (QS system process)

 SpO2 (%):  98 (QS system process)

 LaborFlag:  Labor (QS system process)

   

=================================================================

Datetime: 03/25/2017 19:00

=================================================================

   

 NBP Sys/Zandra/Mean (mmHg):  140 (QS system process)

:  75 (QS system process)

:  98 (QS system process)

 Pulse:  89 (QS system process)

 Monitor Mode:  External; Palpation (Nellie Naidu RN)

 Monitor Interventions for UA:  [place] Adjusted (Cheryle Fan RN)

 Frequency (min):  2-4 (Nellie Naidu RN)

 Quality:  Mild (Cheryle Fan RN)

 Quality:  Mild/Moderate (Nellie Naidu RN)

 Duration (sec):  40-70 (Nellie Naidu RN)

 Resting Tone (Palpate):  Relaxed (Cheryle Fan RN)

 Resting Tone (Palpate):  Relaxed (Nellie Naidu RN)

 Contraction Comments:  RN @ bedside adjusting toco (Cheryle Fan RN)

 Monitor Mode:  External US (Nellie Naidu RN)

 FHR Baseline Rate :  130 (Nellie Naidu RN)

 FHR Baseline Changes:  No Baseline Change (Cheryle Fan RN)

 Variability:  Moderate 6-25 bpm (Nellie Naidu RN)

 Accelerations:  15X15 (Nellie Naidu RN)

 Decelerations:  None (Nellie Naidu RN)

 Pitocin (milliunit):  Pitocin Remains (milliunits) @ 12 (Grace Phelps RN)

 Pitocin (milliunit):  Pitocin Remains (milliunits) @ (Annotations: 12)

   (Grace Phelps RN)

 Epidural Procedure:  Test Dose (Nellie Naidu RN)

 LaborFlag:  Labor (QS system process)

   

=================================================================

Datetime: 03/25/2017 18:58

=================================================================

   

 Pain Scale:  0 (Grace Phelps RN)

 Pain Presence:  None/Denies (Grace Phelps RN)

 Pain Type:  N/A (Grace Phelps RN)

 Pain Goal:  0 (Grace Phelps RN)

 Pain Relief Measures:  Epidural Given (Grace Phelps RN)

 LaborFlag:  Labor (QS system process)

   

=================================================================

Datetime: 03/25/2017 18:57

=================================================================

   

 Epidural Procedure:  Cath Placed (Grace Phelps, RN)

   

=================================================================

Datetime: 03/25/2017 18:56

=================================================================

   

 Pulse:  83 (QS system process)

 SpO2 (%):  99 (QS system process)

 LaborFlag:  Labor (QS system process)

   

=================================================================

Datetime: 03/25/2017 18:51

=================================================================

   

 Pulse:  83 (QS system process)

 SpO2 (%):  99 (QS system process)

 LaborFlag:  Labor (QS system process)

   

=================================================================

Datetime: 03/25/2017 18:49

=================================================================

   

 NBP Sys/Zandra/Mean (mmHg):  150 (QS system process)

:  90 (QS system process)

:  115 (QS system process)

 Pulse:  88 (QS system process)

 LaborFlag:  Labor (QS system process)

   

=================================================================

Datetime: 03/25/2017 18:48

=================================================================

   

 NBP Sys/Zandra/Mean (mmHg):  160 (QS system process)

:  96 (QS system process)

:  121 (QS system process)

 Pulse:  86 (QS system process)

 LaborFlag:  Labor (QS system process)

   

=================================================================

Datetime: 03/25/2017 18:47

=================================================================

   

 NBP Sys/Zandra/Mean (mmHg):  172 (QS system process)

:  108 (QS system process)

:  133 (QS system process)

 Pulse:  76 (QS system process)

 Temperature (F):  98.8 (Grace Phelps RN)

 Temperature (C):  37.1 (QS system process)

 Temperature Route:  Axillary (Grace Phelps RN)

 LaborFlag:  Labor (QS system process)

   

=================================================================

Datetime: 03/25/2017 18:46

=================================================================

   

 Pulse:  87 (QS system process)

 SpO2 (%):  98 (QS system process)

 LaborFlag:  Labor (QS system process)

   

=================================================================

Datetime: 03/25/2017 18:45

=================================================================

   

 Monitor Mode:  External (Cheryle Fan RN)

 Frequency (min):  2-3 (Cheryle Fan RN)

 Quality:  Mild (Cheryle Fan RN)

 Duration (sec):  40-60 (Cheryle Fan RN)

 Resting Tone (Palpate):  Relaxed (Cheryle Fan RN)

 Monitor Mode:  External US (Cheryle Fan RN)

 FHR Baseline Rate :  125 (Cheryle Fan RN)

 FHR Baseline Changes:  No Baseline Change (Cheryle Fan RN)

 Variability:  Moderate 6-25 bpm (Cheryle Marhefka, RN)

 Accelerations:  None (Cheryle Marhefka, RN)

 Decelerations:  None (Cheryle Marhefka, RN)

 Pitocin (milliunit):  Pitocin Remains (milliunits) @ 12 (Grace Phelps, RN)

   

=================================================================

Datetime: 03/25/2017 18:39

=================================================================

   

 Procedure Verify:  Correct Patient Identity; Correct Side and Site are

   Marked; Accurate Procedure Consent Form; Agreement on Procedure to

   be Done; Correct Patient Position (Nellie Williss, RN)

 Communication Comments:  patient sitting up for epidural (Nellie Evangelista,

   RN)

   

=================================================================

Datetime: 03/25/2017 18:38

=================================================================

   

 Anesthesia Comments:  Dr. Garcia at bedside (Nellie Evangelista, RN)

   

=================================================================

Datetime: 03/25/2017 18:37

=================================================================

   

 Epidural Positioning:  Sitting (Nellie Evangelista, RN)

   

=================================================================

Datetime: 03/25/2017 18:35

=================================================================

   

 Anesthesia Comments:  Dr. Chilango notified of patient's request

   for epidural. (Grace Phelps, RN)

   

=================================================================

Datetime: 03/25/2017 18:32

=================================================================

   

 IV/Blood Work:  IV Bolus Started (Grace Phelps, RN)

   

=================================================================

Datetime: 03/25/2017 18:31

=================================================================

   

 Pain Scale:  5 (Grace Marinellion, RN)

 Pain Presence:  Intermittent (Grace Phelps, RN)

 Pain Type:  Contraction (Grace Phelps, RN)

 Pain Location:  Abdomen (Grace Phelps, RN)

 Pain Goal:  0 (Grace Phelps, RN)

 LaborFlag:  Labor (QS system process)

   

=================================================================

Datetime: 03/25/2017 18:30

=================================================================

   

 Monitor Mode:  External (Cheryle Marhefka, RN)

 Frequency (min):  UTD Pt sitting for epidural  (Cheryle Fan, RN)

 Quality:  Mild (Cheryle Marhefka, RN)

 Resting Tone (Palpate):  Relaxed (Cheryle Marhefka, RN)

 Pitocin (milliunit):  Pitocin Remains (milliunits) @ 12 (Grace Marinellion, RN)

   

=================================================================

Datetime: 03/25/2017 18:15

=================================================================

   

 Monitor Mode:  External (Cheryle Marhefka, RN)

 Frequency (min):  2-3 (Cheryle Fan, RN)

 Quality:  Mild (Cheryle Fan, RN)

 Duration (sec):  50-70 (Cheryle Fan, RN)

 Resting Tone (Palpate):  Relaxed (Cheryle Fan RN)

 Pitocin (milliunit):  Pitocin Remains (milliunits) @ 12 (Grace Phelps RN)

   

=================================================================

Datetime: 03/25/2017 18:06

=================================================================

   

 Comments:  RN at bedside attemtping to locate fhts (Nellie Naidu, RN)

   

=================================================================

Datetime: 03/25/2017 18:00

=================================================================

   

 Monitor Mode:  External; Palpation (Nellie Naidu, RN)

 Frequency (min):  2-4 (Nellie Naidu, RN)

 Quality:  Mild/Moderate (Nellie Evangelista, RN)

 Duration (sec):  40-70 (Nellie Evangelista, RN)

 Resting Tone (Palpate):  Relaxed (Nellie Naidu, RN)

 Monitor Mode:  External US (Nellie Naidu, RN)

 FHR Baseline Rate :  135 (Nellie Naidu, RN)

 Variability:  Moderate 6-25 bpm (Nellie Evangelista, RN)

 Accelerations:  15X15 (Nellie Evangelista, RN)

 Decelerations:  None (Nellie Evangelista, RN)

 Pitocin (milliunit):  Pitocin Remains (milliunits) @ 12 (Grace

   Phelps, RN)

 Pitocin (milliunit):  Pitocin Remains (milliunits) @ (Annotations: 12)

   (Grace Phelps, RN)

   

=================================================================

Datetime: 03/25/2017 17:45

=================================================================

   

 Monitor Mode:  External (Nellie Evangelista, RN)

 Frequency (min):  2-4 (Nellie Evangelista, RN)

 Quality:  Mild/Moderate (Nellie Evangelista, RN)

 Duration (sec):  40-70 (Nellie Evangelista, RN)

 Resting Tone (Palpate):  Relaxed (Nellie Evangelista, RN)

 Monitor Mode:  External US (Nellie Evangelista, RN)

 FHR Baseline Rate :  135 (Nellie Evangelista, RN)

 Variability:  Moderate 6-25 bpm (Nellie Evangelista, RN)

 Accelerations:  15X15 (Nellie Evangelista, RN)

 Decelerations:  None (Nellie Evangelista, RN)

 Pitocin (milliunit):  Pitocin Remains (milliunits) @ 12 (Grace

   Phelps, RN)

   

=================================================================

Datetime: 03/25/2017 17:30

=================================================================

   

 Monitor Mode:  External; Palpation (Nellie Evangelista, RN)

 Frequency (min):  2-4 (Nellie Evangelista, RN)

 Quality:  Mild/Moderate (Nellie Evangelista, RN)

 Duration (sec):  40-70 (Nellie Evangelista, RN)

 Resting Tone (Palpate):  Relaxed (Nellie Evangelista, RN)

 Monitor Mode:  External US (Nellie Evangelista, RN)

 FHR Baseline Rate :  135 (Nellie Evangelista, RN)

 Variability:  Moderate 6-25 bpm (Nellie Evangelista, RN)

 Accelerations:  15X15 (Nellie Evangelista, RN)

 Decelerations:  Early (Nellie Evangelista, RN)

 Pitocin (milliunit):  Pitocin Remains (milliunits) @ 12 (Grace

   Phelps, RN)

   

=================================================================

Datetime: 03/25/2017 17:15

=================================================================

   

 Monitor Mode:  External; Palpation (Grace Phelps, RN)

 Frequency (min):  1.5 (Grace Phelps, RN)

 Quality:  Mild (Grace Phelps, RN)

 Duration (sec):  50-70 (Grace Phelps, RN)

 Resting Tone (Palpate):  Relaxed (Grace Phelps, RN)

 Monitor Mode:  External US (Grace Phelps, RN)

 FHR Baseline Rate :  135 (Grace Phelps, RN)

 Variability:  Minimal - Undetectable to <=5 bpm (Grace Phelps, RN)

 Accelerations:  None (Grace Phelps, RN)

 Decelerations:  Early (Grace Phelps, RN)

 Pitocin (milliunit):  Pitocin Remains (milliunits) @ 12 (Grace

   Phelps, RN)

   

=================================================================

Datetime: 03/25/2017 17:00

=================================================================

   

 Monitor Mode:  External; Palpation (Grace Phelps, RN)

 Frequency (min):  2-4 (Grace Phelps, RN)

 Quality:  Mild (Grace Phelps, RN)

 Duration (sec):  50-70 (Grace Phelps, RN)

 Resting Tone (Palpate):  Relaxed (Grace Phelps, RN)

 Monitor Mode:  External US (Grace Phelps, RN)

 FHR Baseline Rate :  140 (Grace Phelps, RN)

 Variability:  Minimal - Undetectable to <=5 bpm (Grace Phelps, RN)

 Accelerations:  None (Grace Phelps, RN)

 Decelerations:  Early (Grace Phelps, RN)

 Pitocin (milliunit):  Pitocin Increased to (milliunits) @ 12 (Grace

   Phelps, RN)

   

=================================================================

Datetime: 03/25/2017 16:45

=================================================================

   

 Monitor Mode:  External; Palpation (Grace Phelps, RN)

 Frequency (min):  2-3 (Grace Phelps RN)

 Quality:  Mild (Grace Phelps RN)

 Duration (sec):  50-70 (Grace Phelps RN)

 Resting Tone (Palpate):  Relaxed (Grace Phelps RN)

 Monitor Mode:  External US (Grace Phelps RN)

 FHR Baseline Rate :  135 (Grace Phelps RN)

 Variability:  Moderate 6-25 bpm (Grace Phelps RN)

 Accelerations:  15X15 (Grace Phelps RN)

 Decelerations:  None (Grace Phelps RN)

 Pitocin (milliunit):  Pitocin Remains (milliunits) @ 10 (Grace Phelps RN)

   

=================================================================

Datetime: 03/25/2017 16:41

=================================================================

   

 Temperature (F):  98.6 (Grace Phelps RN)

 Temperature (C):  37.0 (QS system process)

 LaborFlag:  Labor (QS system process)

   

=================================================================

Datetime: 03/25/2017 16:30

=================================================================

   

 Contraction Comments:  UTD; pt in restroom. (Grace Phelps RN)

 Comments:  UTD; Pt. up to restroom (Grace Phelps RN)

 Pitocin (milliunit):  Pitocin Remains (milliunits) @ 10 (Grace Phelps RN)

   

=================================================================

Datetime: 03/25/2017 16:15

=================================================================

   

 Monitor Mode:  External; Palpation (Grace Phelps RN)

 Frequency (min):  2-3 (Grace Phelps RN)

 Quality:  Mild (Grace Phelps RN)

 Duration (sec):  50-70 (Grace Phelps RN)

 Resting Tone (Palpate):  Relaxed (Grace Phelps RN)

 Monitor Mode:  External US (Grace Phelps RN)

 FHR Baseline Rate :  135 (Grace Pehlps RN)

 Variability:  Moderate 6-25 bpm (Grace Phelps RN)

 Accelerations:  15X15 (Grace Phelps RN)

 Comments:  Poor tracing due to maternal position sitting up in bed.

   (Grace Phelps RN)

 Pitocin (milliunit):  Pitocin Increased to (milliunits) @ 10 (Grace Phelps RN)

   

=================================================================

Datetime: 03/25/2017 16:12

=================================================================

   

 I/O Interventions:  Up to BR (Grace Phelps, RN)

   

=================================================================

Datetime: 03/25/2017 16:00

=================================================================

   

 Monitor Mode:  External; Palpation (Gracegustavo Phelps, RN)

 Frequency (min):  2-3 (Grace Marinellion, RN)

 Quality:  Mild (Grace Phelps, RN)

 Duration (sec):  50-70 (Gracegustavo Phelps, RN)

 Resting Tone (Palpate):  Relaxed (Grace Phelps, RN)

 Monitor Mode:  External US (Grace Phelps, RN)

 FHR Baseline Rate :  135 (Grace Phelps, RN)

 Variability:  Moderate 6-25 bpm (Grace Phelps, RN)

 Accelerations:  15X15 (Grace Phelps, RN)

 Decelerations:  None (Gracegustavo Phelps, RN)

 Pitocin (milliunit):  Pitocin Increased to (milliunits) @

   (Annotations: 8) (Grace Phelps, RN)

   

=================================================================

Datetime: 03/25/2017 15:45

=================================================================

   

 Monitor Mode:  External; Palpation (Nellieannmarie Naidu, RN)

 Frequency (min):  2-3 (Grace Phelps, RN)

 Quality:  Mild (Nellie Evangelista, RN)

 Duration (sec):  50-70 (Grace Phelps, RN)

 Resting Tone (Palpate):  Relaxed (Nellie Evangelista, RN)

 Monitor Mode:  External US (Nellie Evangelista, RN)

 FHR Baseline Rate :  135 (Nellie Evangelista, RN)

 Variability:  Moderate 6-25 bpm (Nellie Evangelista, RN)

 Accelerations:  15X15 (Nellie Evangelista, RN)

 Decelerations:  None (Nellie Evangelista, RN)

 Pitocin (milliunit):  Pitocin Remains (milliunits) @ (Annotations: 6)

   (Grace Phelps, RN)

   

=================================================================

Datetime: 03/25/2017 15:30

=================================================================

   

 Monitor Mode:  External; Palpation (Grace Phelps, RN)

 Frequency (min):  3-5 (Grace Phelps, RN)

 Quality:  Mild (Grace Phelps, RN)

 Duration (sec):  40-70 (Grace Phelps, RN)

 Resting Tone (Palpate):  Relaxed (Grace Phleps, RN)

 Monitor Mode:  External US (Grace Phelps, RN)

 FHR Baseline Rate :  135 (Grace Phelps, RN)

 Variability:  Moderate 6-25 bpm (Grace Phelps, RN)

 Accelerations:  15X15 (Grace Phelps, RN)

 Decelerations:  None (Grace Phelps, RN)

 Pitocin (milliunit):  Pitocin Increased to (milliunits) @ 6 (Grace

   Phelps, RN)

   

=================================================================

Datetime: 03/25/2017 15:24

=================================================================

   

 I/O Interventions:  Up to BR (Nellie Evangelista, RN)

   

=================================================================

Datetime: 03/25/2017 15:15

=================================================================

   

 Monitor Mode:  External; Palpation (Nellie Evangelista, RN)

 Frequency (min):  4-6 (Nellie Evangelista, RN)

 Quality:  Mild (Nellie Evangelista, RN)

 Duration (sec):  60-80 (Nellie Evangelista, RN)

 Resting Tone (Palpate):  Relaxed (Nellie Evangelista, RN)

 Monitor Mode:  External US (Nellie Evangelista, RN)

 Variability:  Moderate 6-25 bpm (Nellie Evangelista, RN)

 Accelerations:  15X15 (Nellie Evangelista, RN)

 Decelerations:  None (Nellie Evangelista, RN)

 Pitocin (milliunit):  Pitocin Increased to (milliunits) @ 4 (Grace Phelps, RN)

   

=================================================================

Datetime: 03/25/2017 15:00

=================================================================

   

 Monitor Mode:  External; Palpation (Grace Phelps, RN)

 Frequency (min):  4-6 (Grace Phelps, RN)

 Quality:  Mild (Grace Phelps, RN)

 Duration (sec):  60-80 (Grace Phelps, RN)

 Resting Tone (Palpate):  Relaxed (Grace Phelps, RN)

 Monitor Mode:  External US (Grace Phelps, RN)

 FHR Baseline Rate :  135 (Grace Phelps, RN)

 Variability:  Moderate 6-25 bpm (Grace Phelps, RN)

 Accelerations:  Prolonged (Grace Phelps, RN)

 Decelerations:  None (Grace Phelps, RN)

 Pitocin (milliunit):  Pitocin Remains (milliunits) @ (Annotations: 2)

   (Grace Phelps, RN)

   

=================================================================

Datetime: 03/25/2017 14:56

=================================================================

   

 Pitocin (milliunit):  Pitocin Started (milliunits) @ 2 (Grace

   Hpelps, RN)

   

=================================================================

Datetime: 03/25/2017 14:47

=================================================================

   

 NBP Sys/Zandra/Mean (mmHg):  140 (QS system process)

:  76 (QS system process)

:  98 (QS system process)

 Pulse:  82 (QS system process)

 Temperature (F):  98.6 (Grace Phelps RN)

 Temperature (C):  37.0 (QS system process)

 Temperature Route:  Axillary (Grace Phelps RN)

 LaborFlag:  Labor (QS system process)

   

=================================================================

Datetime: 03/25/2017 14:00

=================================================================

   

 Dilatation (cm):  2.0 (Grace Phelps RN)

 Effacement (%):  80 (Grace Phepls RN)

 Station:  -2 (Grace Phelps RN)

 Exam by:  ELENO Phelps RN (Grace Phelps RN)

 Membrane Status:  Ruptured (Grace Phelps RN)

 Membranes Ruptured Date/Time:  03/25/2017 08:00 (Grace Phelps RN)

 Membranes Rupture Method:  Spontaneous (Grace Phelps RN)

 Amniotic Fluid Color:  Particulate Meconium (Grace Phelps RN)

 Amniotic Fluid Amount:  Small (Grace Phelps RN)

 Amniotic Fluid Odor:  Normal (Grace Phelps RN)

 Vaginal Bleeding:  Normal Show (Grace Phelps RN)

   

=================================================================

Datetime: 03/25/2017 13:34

=================================================================

   

 Comments:  Monitors removed.  Pt. ambulating per MD order. (Grace

   Phelps, RN)

   

=================================================================

Datetime: 03/25/2017 13:30

=================================================================

   

 Monitor Mode:  External; Palpation (Grace Phelps, RN)

 Frequency (min):  Irreg (Grace Phelps, RN)

 Quality:  Mild (Grace Phelps, RN)

 Duration (sec):  120-180 (Grace Phelps, RN)

 Resting Tone (Palpate):  Relaxed (Grace Phelps, RN)

 Monitor Mode:  External US (Grace Phelps, RN)

 FHR Baseline Rate :  130 (Grace Phelps, RN)

 Variability:  Moderate 6-25 bpm (Grace Phelps, RN)

 Accelerations:  15X15 (Grace Phelps, RN)

 Decelerations:  None (Grace Phelps, RN)

   

=================================================================

Datetime: 03/25/2017 13:19

=================================================================

   

 Communication:  Provider Orders Received; Call/Page Placed to Provider

   (Grace Phelps RN)

 Provider Notified (Name):  Dr. Torres (Grace Phelps RN)

 Communication Comments:  Notified of fhts, ctx, SVE, labs, and v/s. 

   Received orders to admit to L_D for labor.  May ambulate for 1 hour,

   then recheck cervix.   If unchanged, start Pitocin per protocol.

   (Grace hPelps RN)

   

=================================================================

Datetime: 03/25/2017 13:03

=================================================================

   

 NBP Sys/Zandra/Mean (mmHg):  131 (QS system process)

:  84 (QS system process)

:  101 (QS system process)

 Pulse:  88 (QS system process)

 LaborFlag:  Labor (QS system process)

   

=================================================================

Datetime: 03/25/2017 13:00

=================================================================

   

 Monitor Mode:  External; Palpation (Grace Phelps RN)

 Frequency (min):  Irreg (Grace Phelps RN)

 Quality:  Mild (Grace Phelps RN)

 Duration (sec):  120-180 (Grace Phelps, RN)

 Resting Tone (Palpate):  Relaxed (Grace Phelps, RN)

 Monitor Mode:  External US (Grace Phelps, RN)

 FHR Baseline Rate :  130 (Grace Phelps, RN)

 Variability:  Moderate 6-25 bpm (Grace Phelps, RN)

 Accelerations:  15X15 (Grace Phelps, RN)

 Decelerations:  None (Grace Phelps, RN)

 IV/Blood Work:  IV Started (Grace Phelps, RN)

   

=================================================================

Datetime: 03/25/2017 12:30

=================================================================

   

 Monitor Mode:  External; Palpation (Grace Phelps, RN)

 Frequency (min):  x1 (Grace Phelps, RN)

 Quality:  Mild (Grace Phelps, RN)

 Duration (sec):  120 (Grace Phelps, RN)

 Resting Tone (Palpate):  Relaxed (Grace Phelps, RN)

 Monitor Mode:  External US (Grace Phelps, RN)

 FHR Baseline Rate :  135 (Grace Phelps, RN)

 Variability:  Moderate 6-25 bpm (Grace Phelps, RN)

 Accelerations:  15X15 (Grace Phelps, RN)

 Decelerations:  None (Grace Phelps, RN)

   

=================================================================

Datetime: 03/25/2017 12:22

=================================================================

   

 Pain Scale:  0 (Grace Phelps RN)

 Pain Presence:  None/Denies (Grace Phelps, RN)

 Pain Type:  N/A (Grace Phelps, RN)

 Pain Relief Measures:  Comfort Measures (Grace Phelps, RN)

 Pain Coping:  Breathing Through Contractions (Grace Phelps, RN)

 Level of Consciousness:  Fully Conscious (Grace Phelps, RN)

 DTR's/Clonus:  DTRs 1+; No Clonus (Grace Phelps, RN)

 Headache:  Denies (Grace Phelps, RN)

 Breath Sounds, Left:  Clear and Equal (Grace Phelps RN)

 Breath Sounds, Right:  Clear and Equal (Grace Phelps, RN)

 Nausea/Vomiting:  Denies (Grace Phelps, RN)

 RUQ Epigastric Pain:  Denies (Grace Phelps, RN)

 LaborFlag:  Labor (QS system process)

   

=================================================================

Datetime: 03/25/2017 11:50

=================================================================

   

 NBP Sys/Zandra/Mean (mmHg):  129 (QS system process)

:  81 (QS system process)

:  100 (QS system process)

 Pulse:  94 (QS system process)

 LaborFlag:  Labor (QS system process)

   

=================================================================

Datetime: 03/25/2017 11:48

=================================================================

   

 Comments:  Monitors applied, explained to pt. (Grace Marinellion, RN)

 Patient Position/Activity:  Right Lateral (Grace Marinellion, RN)

   

=================================================================

Datetime: 03/25/2017 11:47

=================================================================

   

 Comments:  Monitors applied.  Explained to pt. (Grace Phelps, RN)

 Patient Position/Activity:  Right Lateral (Grace Phelps, RN)

   

=================================================================

Datetime: 03/25/2017 11:46

=================================================================

   

Stage of Pregnancy:  Labor (Grace Phelps, RN)

## 2017-03-25 NOTE — L&D FLOW SHEET
=================================================================

LD Flowsheet

=================================================================

Datetime Report Generated by CPN: 03/25/2017 20:00

   

   

=================================================================

Datetime: 03/25/2017 19:50

=================================================================

   

 Patient Position/Activity:  Left Lateral (Adry Ledgerwood, RN)

   

=================================================================

Datetime: 03/25/2017 19:43

=================================================================

   

 NBP Sys/Zandra/Mean (mmHg):  122 (QS system process)

:  89 (QS system process)

:  101 (QS system process)

 Pulse:  93 (QS system process)

 LaborFlag:  Labor (QS system process)

   

=================================================================

Datetime: 03/25/2017 19:24

=================================================================

   

 Pain Scale:  0 (Adry Coronado, RN)

 Pain Presence:  None/Denies (Adry Coronado, RN)

 Vaginal Bleeding:  None (Adry Coronado, RN)

 Level of Consciousness:  Fully Conscious (Adry Coronado, RN)

 DTR's/Clonus:  DTRs 2+; No Clonus (Adry Coronado, RN)

 Headache:  Denies (Adry Coronado, RN)

 Breath Sounds, Left:  Clear and Equal (Adry Coronado, RN)

 Breath Sounds, Right:  Clear and Equal (Adry Calderongerdayan, RN)

 Nausea/Vomiting:  Denies (Adry Coronado, RN)

 RUQ Epigastric Pain:  Denies (Adry Coronado, RN)

 Instructional Method:  Demo; Verbal; Patient Instructed (Adry Coronado, RN)

 Plan of Care:  Plan of Care Discussed; Vaginal Delivery (Adry Coronado, RN)

 LaborFlag:  Labor (QS system process)

   

=================================================================

Datetime: 03/25/2017 19:23

=================================================================

   

 NBP Sys/Zandra/Mean (mmHg):  141 (QS system process)

:  73 (QS system process)

:  101 (QS system process)

 Pulse:  107 (QS system process)

 LaborFlag:  Labor (QS system process)

   

=================================================================

Datetime: 03/25/2017 19:22

=================================================================

   

 NBP Sys/Zandra/Mean (mmHg):  134 (QS system process)

:  88 (QS system process)

:  107 (QS system process)

 Pulse:  105 (QS system process)

 LaborFlag:  Labor (QS system process)

   

=================================================================

Datetime: 03/25/2017 19:21

=================================================================

   

 Communication:  Report Given to @ ELLIE Coronado RN (Grace Phelps RN)

 Communication Comments:  report received from JOSE Phelps RN at

   bedside (Adry Coronado RN)

   

=================================================================

Datetime: 03/25/2017 19:20

=================================================================

   

 NBP Sys/Zandra/Mean (mmHg):  128 (QS system process)

:  75 (QS system process)

:  96 (QS system process)

 Pulse:  103 (QS system process)

 LaborFlag:  Labor (QS system process)

   

=================================================================

Datetime: 03/25/2017 19:19

=================================================================

   

 NBP Sys/Zandra/Mean (mmHg):  133 (QS system process)

:  63 (QS system process)

:  90 (QS system process)

 Pulse:  88 (QS system process)

 LaborFlag:  Labor (QS system process)

   

=================================================================

Datetime: 03/25/2017 19:18

=================================================================

   

 NBP Sys/Zandra/Mean (mmHg):  138 (QS system process)

:  63 (QS system process)

:  90 (QS system process)

 Pulse:  106 (QS system process)

 LaborFlag:  Labor (QS system process)

   

=================================================================

Datetime: 03/25/2017 19:17

=================================================================

   

 NBP Sys/Zandra/Mean (mmHg):  123 (QS system process)

:  65 (QS system process)

:  89 (QS system process)

 Pulse:  96 (QS system process)

 LaborFlag:  Labor (QS system process)

   

=================================================================

Datetime: 03/25/2017 19:16

=================================================================

   

 NBP Sys/Zandra/Mean (mmHg):  133 (QS system process)

:  69 (QS system process)

:  100 (QS system process)

 Pulse:  101 (QS system process)

 LaborFlag:  Labor (QS system process)

   

=================================================================

Datetime: 03/25/2017 19:15

=================================================================

   

 NBP Sys/Zandra/Mean (mmHg):  145 (QS system process)

:  73 (QS system process)

:  105 (QS system process)

 Pulse:  89 (QS system process)

 Dilatation (cm):  4.0 (Nellie Naidu RN)

 Effacement (%):  90 (Nellie Naidu RN)

 Station:  0 (Nellie Naidu RN)

 Exam by:  ELENO Phelps RN (Nellie Naidu RN)

 LaborFlag:  Labor (QS system process)

   

=================================================================

Datetime: 03/25/2017 19:14

=================================================================

   

 NBP Sys/Zandra/Mean (mmHg):  143 (QS system process)

:  65 (QS system process)

:  93 (QS system process)

 Pulse:  107 (QS system process)

 LaborFlag:  Labor (QS system process)

   

=================================================================

Datetime: 03/25/2017 19:12

=================================================================

   

 NBP Sys/Zandra/Mean (mmHg):  156 (QS system process)

:  67 (QS system process)

:  97 (QS system process)

 Pulse:  94 (QS system process)

 LaborFlag:  Labor (QS system process)

   

=================================================================

Datetime: 03/25/2017 19:11

=================================================================

   

 NBP Sys/Zandra/Mean (mmHg):  145 (QS system process)

:  70 (QS system process)

:  98 (QS system process)

 Pulse:  103 (QS system process)

 LaborFlag:  Labor (QS system process)

   

=================================================================

Datetime: 03/25/2017 19:10

=================================================================

   

 NBP Sys/Zandra/Mean (mmHg):  137 (QS system process)

:  62 (QS system process)

:  93 (QS system process)

 Pulse:  101 (QS system process)

 LaborFlag:  Labor (QS system process)

   

=================================================================

Datetime: 03/25/2017 19:09

=================================================================

   

 NBP Sys/Zandra/Mean (mmHg):  145 (QS system process)

:  66 (QS system process)

:  95 (QS system process)

 Pulse:  92 (QS system process)

 LaborFlag:  Labor (QS system process)

   

=================================================================

Datetime: 03/25/2017 19:07

=================================================================

   

 NBP Sys/Zandra/Mean (mmHg):  142 (QS system process)

:  75 (QS system process)

:  103 (QS system process)

 Pulse:  97 (QS system process)

 LaborFlag:  Labor (QS system process)

   

=================================================================

Datetime: 03/25/2017 19:06

=================================================================

   

 NBP Sys/Zandra/Mean (mmHg):  142 (QS system process)

:  74 (QS system process)

:  103 (QS system process)

 Pulse:  93 (QS system process)

 Epidural Procedure Other:  Pump Started (Grace Phelps RN)

 LaborFlag:  Labor (QS system process)

   

=================================================================

Datetime: 03/25/2017 19:05

=================================================================

   

 NBP Sys/Zandra/Mean (mmHg):  130 (QS system process)

:  69 (QS system process)

:  93 (QS system process)

 Pulse:  90 (QS system process)

 LaborFlag:  Labor (QS system process)

   

=================================================================

Datetime: 03/25/2017 19:04

=================================================================

   

 NBP Sys/Zandra/Mean (mmHg):  145 (QS system process)

:  65 (QS system process)

:  102 (QS system process)

 Pulse:  90 (QS system process)

 Epidural Procedure:  Loading Dose (Grace Phelps RN)

 LaborFlag:  Labor (QS system process)

   

=================================================================

Datetime: 03/25/2017 19:02

=================================================================

   

 NBP Sys/Zandra/Mean (mmHg):  153 (QS system process)

:  96 (QS system process)

:  116 (QS system process)

 Pulse:  91 (QS system process)

 LaborFlag:  Labor (QS system process)

   

=================================================================

Datetime: 03/25/2017 19:01

=================================================================

   

 NBP Sys/Zandra/Mean (mmHg):  151 (QS system process)

:  93 (QS system process)

:  116 (QS system process)

 Pulse:  92 (QS system process)

 Pulse:  85 (QS system process)

 SpO2 (%):  98 (QS system process)

 LaborFlag:  Labor (QS system process)

   

=================================================================

Datetime: 03/25/2017 19:00

=================================================================

   

 NBP Sys/Zandra/Mean (mmHg):  140 (QS system process)

:  75 (QS system process)

:  98 (QS system process)

 Pulse:  89 (QS system process)

 Monitor Mode:  External; Palpation (Nellie Naidu RN)

 Monitor Interventions for UA:  Silex Adjusted (Cheryle Fan RN)

 Frequency (min):  2-4 (Nellie Naidu RN)

 Quality:  Mild (Cheryle Fan RN)

 Quality:  Mild/Moderate (Nellie Naidu RN)

 Duration (sec):  40-70 (Nellie Naidu RN)

 Resting Tone (Palpate):  Relaxed (Cheryle Fan RN)

 Resting Tone (Palpate):  Relaxed (Nellie Naidu RN)

 Contraction Comments:  RN @ bedside adjusting toco (Cheryle Fan RN)

 Monitor Mode:  External US (Nellie Naidu RN)

 FHR Baseline Rate :  130 (Nellie Naidu RN)

 FHR Baseline Changes:  No Baseline Change (Cheryle Fan RN)

 Variability:  Moderate 6-25 bpm (Nellie Naidu RN)

 Accelerations:  15X15 (Nellie Naidu RN)

 Decelerations:  None (Nellie Naidu RN)

 Pitocin (milliunit):  Pitocin Remains (milliunits) @ 12 (Grace Phelps RN)

 Pitocin (milliunit):  Pitocin Remains (milliunits) @ (Annotations: 12)

   (Grace Phelps RN)

 Epidural Procedure:  Test Dose (Nellie Naidu RN)

 LaborFlag:  Labor (QS system process)

   

=================================================================

Datetime: 03/25/2017 18:58

=================================================================

   

 Pain Scale:  0 (Grace Phelps RN)

 Pain Presence:  None/Denies (Grace Phelps RN)

 Pain Type:  N/A (Grace Phelps RN)

 Pain Goal:  0 (Grace Phelps RN)

 Pain Relief Measures:  Epidural Given (Grace Phelps RN)

 LaborFlag:  Labor (QS system process)

   

=================================================================

Datetime: 03/25/2017 18:57

=================================================================

   

 Epidural Procedure:  Cath Placed (Grace Phelps, RN)

   

=================================================================

Datetime: 03/25/2017 18:56

=================================================================

   

 Pulse:  83 (QS system process)

 SpO2 (%):  99 (QS system process)

 LaborFlag:  Labor (QS system process)

   

=================================================================

Datetime: 03/25/2017 18:51

=================================================================

   

 Pulse:  83 (QS system process)

 SpO2 (%):  99 (QS system process)

 LaborFlag:  Labor (QS system process)

   

=================================================================

Datetime: 03/25/2017 18:49

=================================================================

   

 NBP Sys/Zandra/Mean (mmHg):  150 (QS system process)

:  90 (QS system process)

:  115 (QS system process)

 Pulse:  88 (QS system process)

 LaborFlag:  Labor (QS system process)

   

=================================================================

Datetime: 03/25/2017 18:48

=================================================================

   

 NBP Sys/Zandra/Mean (mmHg):  160 (QS system process)

:  96 (QS system process)

:  121 (QS system process)

 Pulse:  86 (QS system process)

 LaborFlag:  Labor (QS system process)

   

=================================================================

Datetime: 03/25/2017 18:47

=================================================================

   

 NBP Sys/Zandra/Mean (mmHg):  172 (QS system process)

:  108 (QS system process)

:  133 (QS system process)

 Pulse:  76 (QS system process)

 Temperature (F):  98.8 (Grace Phelps RN)

 Temperature (C):  37.1 (QS system process)

 Temperature Route:  Axillary (Grace Phelps RN)

 LaborFlag:  Labor (QS system process)

   

=================================================================

Datetime: 03/25/2017 18:46

=================================================================

   

 Pulse:  87 (QS system process)

 SpO2 (%):  98 (QS system process)

 LaborFlag:  Labor (QS system process)

   

=================================================================

Datetime: 03/25/2017 18:45

=================================================================

   

 Monitor Mode:  External (Cheryle Fan RN)

 Frequency (min):  2-3 (Cheryle Fan RN)

 Quality:  Mild (Cheryle Fan RN)

 Duration (sec):  40-60 (Cheryle Fan RN)

 Resting Tone (Palpate):  Relaxed (Cheryle Fan RN)

 Monitor Mode:  External US (Cheryle Fan RN)

 FHR Baseline Rate :  125 (Cheryle Fan RN)

 FHR Baseline Changes:  No Baseline Change (Cheryle Fan RN)

 Variability:  Moderate 6-25 bpm (Cheryle Marhefka, RN)

 Accelerations:  None (Cheryle Marhefka, RN)

 Decelerations:  None (Cheryle Marhefka, RN)

 Pitocin (milliunit):  Pitocin Remains (milliunits) @ 12 (Grace Phelps, RN)

   

=================================================================

Datetime: 03/25/2017 18:39

=================================================================

   

 Procedure Verify:  Correct Patient Identity; Correct Side and Site are

   Marked; Accurate Procedure Consent Form; Agreement on Procedure to

   be Done; Correct Patient Position (Nellie Williss, RN)

 Communication Comments:  patient sitting up for epidural (Nellie Evangelista,

   RN)

   

=================================================================

Datetime: 03/25/2017 18:38

=================================================================

   

 Anesthesia Comments:  Dr. Garcia at bedside (Nellie Evangelista, RN)

   

=================================================================

Datetime: 03/25/2017 18:37

=================================================================

   

 Epidural Positioning:  Sitting (Nellie Evangelista, RN)

   

=================================================================

Datetime: 03/25/2017 18:35

=================================================================

   

 Anesthesia Comments:  Dr. Chilango notified of patient's request

   for epidural. (Grace Phelps, RN)

   

=================================================================

Datetime: 03/25/2017 18:32

=================================================================

   

 IV/Blood Work:  IV Bolus Started (Grace Phelps, RN)

   

=================================================================

Datetime: 03/25/2017 18:31

=================================================================

   

 Pain Scale:  5 (Grace Marinellion, RN)

 Pain Presence:  Intermittent (Grace Phelps, RN)

 Pain Type:  Contraction (Grace Phelps, RN)

 Pain Location:  Abdomen (Grace Phelps, RN)

 Pain Goal:  0 (Grace Phelps, RN)

 LaborFlag:  Labor (QS system process)

   

=================================================================

Datetime: 03/25/2017 18:30

=================================================================

   

 Monitor Mode:  External (Cheryle Marhefka, RN)

 Frequency (min):  UTD Pt sitting for epidural  (Cheryle Fan, RN)

 Quality:  Mild (Cheryle Marhefka, RN)

 Resting Tone (Palpate):  Relaxed (Cheryle Marhefka, RN)

 Pitocin (milliunit):  Pitocin Remains (milliunits) @ 12 (Grace Marinellion, RN)

   

=================================================================

Datetime: 03/25/2017 18:15

=================================================================

   

 Monitor Mode:  External (Cheryle Marhefka, RN)

 Frequency (min):  2-3 (Cheryle Fan, RN)

 Quality:  Mild (Cheryle Fan, RN)

 Duration (sec):  50-70 (Cheryle Fan, RN)

 Resting Tone (Palpate):  Relaxed (Cheryle Fan RN)

 Pitocin (milliunit):  Pitocin Remains (milliunits) @ 12 (Grace Phelps RN)

   

=================================================================

Datetime: 03/25/2017 18:06

=================================================================

   

 Comments:  RN at bedside attemtping to locate fhts (Nellie Naidu, RN)

   

=================================================================

Datetime: 03/25/2017 18:00

=================================================================

   

 Monitor Mode:  External; Palpation (Nellie Naidu, RN)

 Frequency (min):  2-4 (Nellie Naidu, RN)

 Quality:  Mild/Moderate (Nellie Evangelista, RN)

 Duration (sec):  40-70 (Nellie vEangelista, RN)

 Resting Tone (Palpate):  Relaxed (Nellie Naidu, RN)

 Monitor Mode:  External US (Nellie Naidu, RN)

 FHR Baseline Rate :  135 (Nellie Naidu, RN)

 Variability:  Moderate 6-25 bpm (Nellie Evangelista, RN)

 Accelerations:  15X15 (Nellie Evangelista, RN)

 Decelerations:  None (Nellie Evangelista, RN)

 Pitocin (milliunit):  Pitocin Remains (milliunits) @ 12 (Grace

   Phelps, RN)

 Pitocin (milliunit):  Pitocin Remains (milliunits) @ (Annotations: 12)

   (Grace Phelps, RN)

   

=================================================================

Datetime: 03/25/2017 17:45

=================================================================

   

 Monitor Mode:  External (Nellie Evangelista, RN)

 Frequency (min):  2-4 (Nellie Evangelista, RN)

 Quality:  Mild/Moderate (Nellie Evangelista, RN)

 Duration (sec):  40-70 (Nellie Evangelista, RN)

 Resting Tone (Palpate):  Relaxed (Nellie Evangelista, RN)

 Monitor Mode:  External US (Nellie Evangelista, RN)

 FHR Baseline Rate :  135 (Nellie Evangelista, RN)

 Variability:  Moderate 6-25 bpm (Nellie Evangelista, RN)

 Accelerations:  15X15 (Nellie Evangelista, RN)

 Decelerations:  None (Nellie Evangelista, RN)

 Pitocin (milliunit):  Pitocin Remains (milliunits) @ 12 (Grace

   Phelps, RN)

   

=================================================================

Datetime: 03/25/2017 17:30

=================================================================

   

 Monitor Mode:  External; Palpation (Nellie Evangelista, RN)

 Frequency (min):  2-4 (Nellie Evangelista, RN)

 Quality:  Mild/Moderate (Nellie Evangelista, RN)

 Duration (sec):  40-70 (Nellie Evangelista, RN)

 Resting Tone (Palpate):  Relaxed (Nellie Evangelista, RN)

 Monitor Mode:  External US (Nellie Evangelista, RN)

 FHR Baseline Rate :  135 (Nellie Evangelista, RN)

 Variability:  Moderate 6-25 bpm (Nellie Evangelista, RN)

 Accelerations:  15X15 (Nellie Evangelista, RN)

 Decelerations:  Early (Nellie Evangelista, RN)

 Pitocin (milliunit):  Pitocin Remains (milliunits) @ 12 (Grace

   Phelps, RN)

   

=================================================================

Datetime: 03/25/2017 17:15

=================================================================

   

 Monitor Mode:  External; Palpation (Grace Phelps, RN)

 Frequency (min):  1.5 (Grace Phelps, RN)

 Quality:  Mild (Grace Phelps, RN)

 Duration (sec):  50-70 (Grace Phelps, RN)

 Resting Tone (Palpate):  Relaxed (Grace Phelps, RN)

 Monitor Mode:  External US (Grace Phelps, RN)

 FHR Baseline Rate :  135 (Grace Phelps, RN)

 Variability:  Minimal - Undetectable to <=5 bpm (Grace Phelps, RN)

 Accelerations:  None (Grace Phelps, RN)

 Decelerations:  Early (Grace Phelps, RN)

 Pitocin (milliunit):  Pitocin Remains (milliunits) @ 12 (Grace

   Phelps, RN)

   

=================================================================

Datetime: 03/25/2017 17:00

=================================================================

   

 Monitor Mode:  External; Palpation (Grace Phelps, RN)

 Frequency (min):  2-4 (Grace Phelps, RN)

 Quality:  Mild (Grace Phelps, RN)

 Duration (sec):  50-70 (Grace Phelps, RN)

 Resting Tone (Palpate):  Relaxed (Grace Phelps, RN)

 Monitor Mode:  External US (Grace Phelps, RN)

 FHR Baseline Rate :  140 (Grace Phelps, RN)

 Variability:  Minimal - Undetectable to <=5 bpm (Grace Phelps, RN)

 Accelerations:  None (Grace Phelps, RN)

 Decelerations:  Early (Grace Phelps, RN)

 Pitocin (milliunit):  Pitocin Increased to (milliunits) @ 12 (Grace

   Phelps, RN)

   

=================================================================

Datetime: 03/25/2017 16:45

=================================================================

   

 Monitor Mode:  External; Palpation (Grace Phelps, RN)

 Frequency (min):  2-3 (Grace Phelps RN)

 Quality:  Mild (rGace Phelps RN)

 Duration (sec):  50-70 (Grace Phelps RN)

 Resting Tone (Palpate):  Relaxed (Grace Phelps RN)

 Monitor Mode:  External US (Grace Phelps RN)

 FHR Baseline Rate :  135 (Grace Phelps RN)

 Variability:  Moderate 6-25 bpm (Grace Phelps RN)

 Accelerations:  15X15 (Grace Phelps RN)

 Decelerations:  None (Grace Phelps RN)

 Pitocin (milliunit):  Pitocin Remains (milliunits) @ 10 (Grace Phelps RN)

   

=================================================================

Datetime: 03/25/2017 16:41

=================================================================

   

 Temperature (F):  98.6 (Grace Phelps RN)

 Temperature (C):  37.0 (QS system process)

 LaborFlag:  Labor (QS system process)

   

=================================================================

Datetime: 03/25/2017 16:30

=================================================================

   

 Contraction Comments:  UTD; pt in restroom. (Grace Phelps RN)

 Comments:  UTD; Pt. up to restroom (Grace Phelps RN)

 Pitocin (milliunit):  Pitocin Remains (milliunits) @ 10 (Grace Phelps RN)

   

=================================================================

Datetime: 03/25/2017 16:15

=================================================================

   

 Monitor Mode:  External; Palpation (Grace Phelps RN)

 Frequency (min):  2-3 (Grace Phelps RN)

 Quality:  Mild (Grace Phelps RN)

 Duration (sec):  50-70 (Grace Phelps RN)

 Resting Tone (Palpate):  Relaxed (Grace Phelps RN)

 Monitor Mode:  External US (Grace Phelps RN)

 FHR Baseline Rate :  135 (Grace Phelps RN)

 Variability:  Moderate 6-25 bpm (Grace Phelps RN)

 Accelerations:  15X15 (Grace Phelps RN)

 Comments:  Poor tracing due to maternal position sitting up in bed.

   (Grace Phelps RN)

 Pitocin (milliunit):  Pitocin Increased to (milliunits) @ 10 (Grace Phelps RN)

   

=================================================================

Datetime: 03/25/2017 16:12

=================================================================

   

 I/O Interventions:  Up to BR (Grace Phelps, RN)

   

=================================================================

Datetime: 03/25/2017 16:00

=================================================================

   

 Monitor Mode:  External; Palpation (Gracegustavo Phelps, RN)

 Frequency (min):  2-3 (Grace Marinellion, RN)

 Quality:  Mild (Grace Phelps, RN)

 Duration (sec):  50-70 (Gracegustavo Phelps, RN)

 Resting Tone (Palpate):  Relaxed (Grace Phelps, RN)

 Monitor Mode:  External US (Grace Phelps, RN)

 FHR Baseline Rate :  135 (Grace Phelps, RN)

 Variability:  Moderate 6-25 bpm (Grace Phelps, RN)

 Accelerations:  15X15 (Grace Phelps, RN)

 Decelerations:  None (Gracegustavo Phelps, RN)

 Pitocin (milliunit):  Pitocin Increased to (milliunits) @

   (Annotations: 8) (Grace Phelps, RN)

   

=================================================================

Datetime: 03/25/2017 15:45

=================================================================

   

 Monitor Mode:  External; Palpation (Nellieannmarie Naidu, RN)

 Frequency (min):  2-3 (Grace Phelps, RN)

 Quality:  Mild (Nellie Evangelista, RN)

 Duration (sec):  50-70 (Grace Phelps, RN)

 Resting Tone (Palpate):  Relaxed (Nellie Evangelista, RN)

 Monitor Mode:  External US (Nellie Evangelista, RN)

 FHR Baseline Rate :  135 (Nellie Evangelista, RN)

 Variability:  Moderate 6-25 bpm (Nellie Evangelista, RN)

 Accelerations:  15X15 (Nellie Evangelista, RN)

 Decelerations:  None (Nellie Evangelista, RN)

 Pitocin (milliunit):  Pitocin Remains (milliunits) @ (Annotations: 6)

   (Grace Phelps, RN)

   

=================================================================

Datetime: 03/25/2017 15:30

=================================================================

   

 Monitor Mode:  External; Palpation (Grace Phelps, RN)

 Frequency (min):  3-5 (Grace Phelps, RN)

 Quality:  Mild (Grace Phelps, RN)

 Duration (sec):  40-70 (Grace Phelps, RN)

 Resting Tone (Palpate):  Relaxed (Grace Phelps, RN)

 Monitor Mode:  External US (Grace Phelps, RN)

 FHR Baseline Rate :  135 (Grace Phelps, RN)

 Variability:  Moderate 6-25 bpm (Grace Phelps, RN)

 Accelerations:  15X15 (Grace Phelps, RN)

 Decelerations:  None (Grace Phelps, RN)

 Pitocin (milliunit):  Pitocin Increased to (milliunits) @ 6 (Grace

   Phelps, RN)

   

=================================================================

Datetime: 03/25/2017 15:24

=================================================================

   

 I/O Interventions:  Up to BR (Nellie Evangelista, RN)

   

=================================================================

Datetime: 03/25/2017 15:15

=================================================================

   

 Monitor Mode:  External; Palpation (Nellie Evangelista, RN)

 Frequency (min):  4-6 (Nellie Evangelista, RN)

 Quality:  Mild (Nellie Evangelista, RN)

 Duration (sec):  60-80 (Nellie Evangelista, RN)

 Resting Tone (Palpate):  Relaxed (Nellie Evangelista, RN)

 Monitor Mode:  External US (Nellie Evangelista, RN)

 Variability:  Moderate 6-25 bpm (Nellie Evangelista, RN)

 Accelerations:  15X15 (Nellie Evangelista, RN)

 Decelerations:  None (Nellie Evangelista, RN)

 Pitocin (milliunit):  Pitocin Increased to (milliunits) @ 4 (Grace Phelps, RN)

   

=================================================================

Datetime: 03/25/2017 15:00

=================================================================

   

 Monitor Mode:  External; Palpation (Grace Phelps, RN)

 Frequency (min):  4-6 (Grace Phelps, RN)

 Quality:  Mild (Grace Phelps, RN)

 Duration (sec):  60-80 (Grace Phelps, RN)

 Resting Tone (Palpate):  Relaxed (Grace Phelps, RN)

 Monitor Mode:  External US (Grace Phelps, RN)

 FHR Baseline Rate :  135 (Grace Phelps, RN)

 Variability:  Moderate 6-25 bpm (Grace Phelps, RN)

 Accelerations:  Prolonged (Grace Phelps, RN)

 Decelerations:  None (Grace Phelps, RN)

 Pitocin (milliunit):  Pitocin Remains (milliunits) @ (Annotations: 2)

   (Grace Phelps, RN)

   

=================================================================

Datetime: 03/25/2017 14:56

=================================================================

   

 Pitocin (milliunit):  Pitocin Started (milliunits) @ 2 (Grace

   Phelps, RN)

   

=================================================================

Datetime: 03/25/2017 14:47

=================================================================

   

 NBP Sys/Zandra/Mean (mmHg):  140 (QS system process)

:  76 (QS system process)

:  98 (QS system process)

 Pulse:  82 (QS system process)

 Temperature (F):  98.6 (Grace Phelps RN)

 Temperature (C):  37.0 (QS system process)

 Temperature Route:  Axillary (Grace Phelps RN)

 LaborFlag:  Labor (QS system process)

   

=================================================================

Datetime: 03/25/2017 14:00

=================================================================

   

 Dilatation (cm):  2.0 (Grace Phelps RN)

 Effacement (%):  80 (Grace Phelps RN)

 Station:  -2 (rGace Phelps RN)

 Exam by:  ELENO Phelps RN (Grace Phelps RN)

 Membrane Status:  Ruptured (Grace Phelps RN)

 Membranes Ruptured Date/Time:  03/25/2017 08:00 (Grace Phelps RN)

 Membranes Rupture Method:  Spontaneous (Grace Phelps RN)

 Amniotic Fluid Color:  Particulate Meconium (Grace Phelps RN)

 Amniotic Fluid Amount:  Small (Grace Phelps RN)

 Amniotic Fluid Odor:  Normal (Grace Phelps RN)

 Vaginal Bleeding:  Normal Show (Grace Phelps RN)

   

=================================================================

Datetime: 03/25/2017 13:34

=================================================================

   

 Comments:  Monitors removed.  Pt. ambulating per MD order. (Grace

   Phelps, RN)

   

=================================================================

Datetime: 03/25/2017 13:30

=================================================================

   

 Monitor Mode:  External; Palpation (Grace Phelps, RN)

 Frequency (min):  Irreg (Grace Phelps, RN)

 Quality:  Mild (Grace Phelps, RN)

 Duration (sec):  120-180 (Grace Phelps, RN)

 Resting Tone (Palpate):  Relaxed (Grace Phelps, RN)

 Monitor Mode:  External US (Grace Phelps, RN)

 FHR Baseline Rate :  130 (Grace Phelps, RN)

 Variability:  Moderate 6-25 bpm (Grace Phelps, RN)

 Accelerations:  15X15 (Grace Phelps, RN)

 Decelerations:  None (Grace Phelps, RN)

   

=================================================================

Datetime: 03/25/2017 13:19

=================================================================

   

 Communication:  Provider Orders Received; Call/Page Placed to Provider

   (Grace Phelps RN)

 Provider Notified (Name):  Dr. Torres (Grace Phelps RN)

 Communication Comments:  Notified of fhts, ctx, SVE, labs, and v/s. 

   Received orders to admit to L_D for labor.  May ambulate for 1 hour,

   then recheck cervix.   If unchanged, start Pitocin per protocol.

   (Grace Phelps RN)

   

=================================================================

Datetime: 03/25/2017 13:03

=================================================================

   

 NBP Sys/Zandra/Mean (mmHg):  131 (QS system process)

:  84 (QS system process)

:  101 (QS system process)

 Pulse:  88 (QS system process)

 LaborFlag:  Labor (QS system process)

   

=================================================================

Datetime: 03/25/2017 13:00

=================================================================

   

 Monitor Mode:  External; Palpation (Grace Phelps RN)

 Frequency (min):  Irreg (Grace Phelps RN)

 Quality:  Mild (Grace Phelps RN)

 Duration (sec):  120-180 (Grace Phelps, RN)

 Resting Tone (Palpate):  Relaxed (Grace Phelps, RN)

 Monitor Mode:  External US (Grace Phelps, RN)

 FHR Baseline Rate :  130 (Grace Phelps, RN)

 Variability:  Moderate 6-25 bpm (Grace Phelps, RN)

 Accelerations:  15X15 (Grace Phelps, RN)

 Decelerations:  None (Grace Phelps, RN)

 IV/Blood Work:  IV Started (Grace Phelps, RN)

   

=================================================================

Datetime: 03/25/2017 12:30

=================================================================

   

 Monitor Mode:  External; Palpation (Grace Phelps, RN)

 Frequency (min):  x1 (Grace Phelps, RN)

 Quality:  Mild (Grace Phelps, RN)

 Duration (sec):  120 (Grace Phelps, RN)

 Resting Tone (Palpate):  Relaxed (Grace Phelps, RN)

 Monitor Mode:  External US (Grace Phelps, RN)

 FHR Baseline Rate :  135 (Grace Phelps, RN)

 Variability:  Moderate 6-25 bpm (Grace Phelps, RN)

 Accelerations:  15X15 (Grace Phelps, RN)

 Decelerations:  None (Grace Phelps, RN)

   

=================================================================

Datetime: 03/25/2017 12:22

=================================================================

   

 Pain Scale:  0 (Grace Phelps RN)

 Pain Presence:  None/Denies (Grace Phelps, RN)

 Pain Type:  N/A (Grace Phelps, RN)

 Pain Relief Measures:  Comfort Measures (Grace Phelps, RN)

 Pain Coping:  Breathing Through Contractions (Grace Phelps, RN)

 Level of Consciousness:  Fully Conscious (Grace Phelps, RN)

 DTR's/Clonus:  DTRs 1+; No Clonus (Grace Phelps, RN)

 Headache:  Denies (Grace Phelps, RN)

 Breath Sounds, Left:  Clear and Equal (Grace Phelps RN)

 Breath Sounds, Right:  Clear and Equal (Grace Phelps, RN)

 Nausea/Vomiting:  Denies (Grace Phelps, RN)

 RUQ Epigastric Pain:  Denies (Grace Phelps, RN)

 LaborFlag:  Labor (QS system process)

   

=================================================================

Datetime: 03/25/2017 11:50

=================================================================

   

 NBP Sys/Zandra/Mean (mmHg):  129 (QS system process)

:  81 (QS system process)

:  100 (QS system process)

 Pulse:  94 (QS system process)

 LaborFlag:  Labor (QS system process)

   

=================================================================

Datetime: 03/25/2017 11:48

=================================================================

   

 Comments:  Monitors applied, explained to pt. (Grace Marinellion, RN)

 Patient Position/Activity:  Right Lateral (Grace Marinellion, RN)

   

=================================================================

Datetime: 03/25/2017 11:47

=================================================================

   

 Comments:  Monitors applied.  Explained to pt. (Grace Phelps, RN)

 Patient Position/Activity:  Right Lateral (Grace Phelps, RN)

   

=================================================================

Datetime: 03/25/2017 11:46

=================================================================

   

Stage of Pregnancy:  Labor (Grace Phelps, RN)

## 2017-03-26 PROCEDURE — 0HQ9XZZ REPAIR PERINEUM SKIN, EXTERNAL APPROACH: ICD-10-PCS | Performed by: OBSTETRICS & GYNECOLOGY

## 2017-03-26 RX ADMIN — PRENATAL W/O A VIT W/ FE FUMARATE-FA CAP 106.5-1 MG SCH CAP: 106.5 CAPSULE ORAL at 10:39

## 2017-03-26 RX ADMIN — FERROUS SULFATE TAB 325 MG (65 MG ELEMENTAL FE) SCH MG: 325 (65 FE) TAB at 10:39

## 2017-03-26 RX ADMIN — IBUPROFEN SCH MG: 800 TABLET, FILM COATED ORAL at 05:44

## 2017-03-26 RX ADMIN — SENNOSIDES, DOCUSATE SODIUM SCH EACH: 50; 8.6 TABLET, FILM COATED ORAL at 10:39

## 2017-03-26 RX ADMIN — DOCUSATE SODIUM SCH MG: 100 CAPSULE, LIQUID FILLED ORAL at 17:26

## 2017-03-26 RX ADMIN — FERROUS SULFATE TAB 325 MG (65 MG ELEMENTAL FE) SCH MG: 325 (65 FE) TAB at 17:26

## 2017-03-26 RX ADMIN — IBUPROFEN SCH MG: 800 TABLET, FILM COATED ORAL at 21:53

## 2017-03-26 RX ADMIN — IBUPROFEN SCH MG: 800 TABLET, FILM COATED ORAL at 13:49

## 2017-03-26 RX ADMIN — DOCUSATE SODIUM SCH MG: 100 CAPSULE, LIQUID FILLED ORAL at 10:39

## 2017-03-26 NOTE — ADMISSION PHYSICAL
=================================================================



=================================================================

Datetime Report Generated by CPN: 2017 03:43

   

   

=================================================================

CURRENT ADMISSION

=================================================================

   

Prenatal Hx Assessment:  The Prenatal History has been Reviewed and is

   Current

Chief Complaint:  Suspected Ruptured Membranes

Indication for Induction:  PROM

Admit Plan:  Admit to Unit; Initiate Labor Augmentation Protocol

   

=================================================================

ALLERGIES

=================================================================

   

Medication Allergies:  No

Medication Allergies:  No Known Allergies (2017)

Medication Allergies:  No Known Allergies (02/15/2017)

Medication Allergies:  No Known Allergies (2017)

Latex:  No Latex Allergies

Food Allergies:  jalepenos

   

=================================================================

OBSTETRICAL HISTORY

=================================================================

   

EDC:  2017 00:00

:  1

Para:  0

Term:  0

:  0

SAB:  0

IAB:  0

Ectopic:  0

Livin

Cesareans:  0

VBACs:  0

Multiple Births:  0

Gestational Diabetes:  No

Rh Sensitization:  No

Incompetent Cervix:  No

MERI:  No

Infertility:  No

ART Treatment:  No

Uterine Anomaly:  No

IUGR:  No

Hx Previous C/S:  No

Macrosomia:  No

Hx Loss/Stillborn:  No

PIH:  No

Hx  Death:  No

Placenta Previa/Abruption:  No

Depression/PP Depression:  No

PTL/PROM:  No

Post Partum Hemorrhage:  No

Obstetrical History Comments:  G1 - current pregnancy

   Infant has mass in abdomen monitored by [facility]

   

=================================================================

***SEE PRENATAL RECORDS***

=================================================================

   

Alcohol:  No

Marijuana :  No

Cocaine:  No

Other Illicit Drugs:  No

Cigarettes:  Former Smoker. [id number]

   

=================================================================

MEDICAL HISTORY

=================================================================

   

Diabetes:  No

Blood Transfusion:  No

Pulmonary Disease (Asthma, TB):  No

Breast Disease:  No

Hypertension:  No

Gyn Surgery:  No

Heart Disease:  No

Hosp/Surgery:  Yes

Autoimmune Disorder:  No

Anesthetic Complications:  No

Kidney Disease:  No

Abnormal Pap Smear:  No

Neuro/Epilepsy:  No

Psychiatric Disorders:  No

Other Medical Diseases:  No

Hepatitis/Liver Disease:  No

Significant Family History:  No

Varicosities/Phlebitis:  No

Trauma/Violence :  No

Thyroid Dysfunction:  No

Medical History Comments:  Hospitalized for possible appendicitis

   

=================================================================

INFECTIOUS HISTORY

=================================================================

   

Gonorrhea:  No

Genital Herpes:  No

Chlamydia:  No

Tuberculosis:  No

Syphilis:  No

Hepatitis:  No

HIV/AIDS Exposure:  No

Rash or Viral Illness:  No

HPV:  No

   

=================================================================

PHYSICAL EXAM

=================================================================

   

General:  Normal

HEENT:  Deferred

Neurologic:  Deferred

Thyroid:  Deferred

Heart:  Normal

Lungs:  Normal

Breast:  Deferred

Back:  Deferred

Abdomen:  Normal

Genitourinary Exam:  Normal

Extremities:  Normal

DTRs:  Normal

Pelvic Type:  Adequate

Vital Signs:  Reviewed; Within Normal Limits

   

=================================================================

MEMBRANES

=================================================================

   

Membranes:  Ruptured

Amniotic Fluid Color:  Meconium, Light

   

=================================================================

FETUS A

=================================================================

   

EGA:  40.1

FHR- Baseline:  140

Variability:  Moderate 6-25bpm

Accelerations:  15X15

Decelerations:  Early; Late

FHR Category:  Category II

Admit Comment:  Term srom this am 0830. pitocin stopped after epidural

   for late decelerations but now has accels and good variability.

   Restart pitocin. gbs neg

   

=================================================================

PLANS FOR LABOR AND DELIVERY

=================================================================

   

Labor and Delivery:  None

Pain Management:  Medications

Feeding Preference:  Breast

Benefit of Breast Feed Discussed:  Yes

Circumcision:  Yes

   

=================================================================

INFORMED CONSENT

=================================================================

   

Signature:  Electronically signed by Miracle Torres MD (ASHLEIGH) on

   3/25/2017 at 21:28  with User ID: [email or web address]

## 2017-03-26 NOTE — PDOC PROGRESS REPORT
Subjective-OB


Subjective: 


Post Delivery Day:








22 year old.  Denies any needs at this time 








Physical Exam (OB)


Vital Signs: 


 











Temp Pulse Resp BP Pulse Ox


 


 97.7 F   91   20   122/71   99 


 


 03/26/17 08:35  03/26/17 08:35  03/26/17 08:35  03/26/17 08:35  03/26/17 08:35








 Intake & Output











 03/25/17 03/26/17 03/27/17





 06:59 06:59 06:59


 


Weight  117.2 kg 














- Lochia


Lochia Amount: Small 10-25 ml


Lochia Color: Rubra/Red





- Abdomen


Description: Soft, Round


Hernia Present: No


Bowel Sounds: Normoactive


Flatus Presence: Present


Stool: No


Fundal Description: Firm, Midline


Fundal Height: u/u - u/2





Objective-Diagnostic


Laboratory: 


 





 03/25/17 12:25 





 











  03/25/17 03/25/17 03/25/17





  11:40 12:25 12:25


 


WBC   11.2 H 


 


RBC   4.40 


 


Hgb   11.5 L 


 


Hct   34.8 L 


 


MCV   79 L 


 


MCH   26.2 L 


 


MCHC   33.0 


 


RDW   14.3 H 


 


Plt Count   167 


 


Seg Neutrophils %   82.1 H 


 


Lymphocytes %   11.1 L 


 


Monocytes %   6.6 


 


Eosinophils %   0.1 


 


Basophils %   0.1 


 


Absolute Neutrophils   9.2 H 


 


Absolute Lymphocytes   1.2 


 


Absolute Monocytes   0.7 


 


Absolute Eosinophils   0.0 


 


Absolute Basophils   0.0 


 


Urine Color  YELLOW  


 


Urine Appearance  CLEAR  


 


Urine pH  6.0  


 


Ur Specific Gravity  1.014  


 


Urine Protein  NEGATIVE  


 


Urine Glucose (UA)  NEGATIVE  


 


Urine Ketones  NEGATIVE  


 


Urine Blood  SMALL H  


 


Urine Nitrite  NEGATIVE  


 


Ur Leukocyte Esterase  NEGATIVE  


 


Blood Type    A POSITIVE


 


Antibody Screen    NEGATIVE

## 2017-03-26 NOTE — L&D CURRENT ADMISSION
=================================================================

Current Admit

=================================================================

Datetime Report Generated by CPN: 03/26/2017 18:00

   

   

=================================================================

ADMISSION INFORMATION

=================================================================

   

Current Admit Date/Time:  03/25/2017 12:40    (03/25/2017 12:40:Grace Phelps RN)

Reason for Admission:  Rupture of Membranes    (03/25/2017 12:40:Grace Phelps RN)

Chief Complaint:  Suspected Rupture of Membranes    (03/25/2017

   12:40:Grace Phelps RN)

Medications During Pregnancy:  Prenatal Vitamin    (03/25/2017

   12:40:Grace Phelps RN)

EGA per Dates:  40.1    (03/25/2017 12:40:QS system process)

Method of Arrival:  Wheelchair    (03/23/2017 08:37:Grace Phelps RN)

Reason for Induction:  Premature Rupture of Membranes    (03/25/2017

   12:40:Grace Phelps RN)

Prenatal Records Available:  Yes    (03/25/2017 12:40:Grace Phelps RN)

General Admission Information:  Reviewed; Updated    (03/25/2017

   12:40:Grace Phelps RN)

General Admission Reviewed By:  ELENO Phelps RN    (03/25/2017

   12:40:Grace Phelps RN)

   

=================================================================

BELONGINGS/ADVANCED DIRECTIVES

=================================================================

   

Valuables/Personal Effects:  Purse/Wallet; Cell Phone    (03/25/2017

   12:40:Grace Phelps RN)

Other Belongings:  laptop, clothing    (03/25/2017 12:40:Grace Phelps RN)

Disposition of Belongings:  Kept with Patient    (03/25/2017 12:40:Grace Phelps RN)

Advance Direct for Healthcare:  No, and Wants No Information   

   (03/25/2017 12:40:Grace Phelps RN)

Durable Power of :  No    (03/25/2017 12:40:Grace Phelps RN)

Living Will:  No    (03/25/2017 12:40:Grace Phelps RN)

Organ Donor:  Yes    (03/25/2017 12:40:Grace Phelps RN)

Pt Rights Information Given:  Yes    (03/25/2017 12:40:Grace Phelps RN)

Pt Understands Pt Rights:  Yes    (03/25/2017 12:40:Grace Phelps RN)

   

=================================================================

LEARNING ASSESSMENT

=================================================================

   

Knowledge Level:  Understands L_D Process; Understands Care Activities;

   Had Pre-Hospital Education; Understands Diagnosis    (03/25/2017

   12:40:Grace Phelps RN)

Barriers to Learning:  None    (03/25/2017 12:40:Grace Phelps RN)

Learning Readiness:  Motivated    (03/25/2017 12:40:Grace Phelps RN)

Learns Best By:  1 to 1 Instruction; Demonstration    (03/25/2017

   12:40:Grace Phelps RN)

Learning Needs:  Labor and Delivery Process; Pain Management; Symptoms

   to Report; Treatment Plan; Medication; Diagnosis; Nutrition;

   Equipment; Infant Care    (03/25/2017 12:40:Grace Phelps RN)

   

=================================================================

DOMESTIC VIOLANCE SCREENING

=================================================================

   

Dom Viol Threatened/Hurt:  No    (03/25/2017 12:40:Grace Phelps RN)

Hx of Abuse/Neglect past 2yrs:  No    (03/25/2017 12:40:Grace Phelps RN)

Feel Unsafe Going Home:  No    (03/25/2017 12:40:Grace Phelps RN)

Addt'l Observ Indicating Abuse:  No    (03/25/2017 12:40:Grace Phepls RN)

Reason Unable to Complete Screen:  N/A, Screen Completed    (03/25/2017

   12:40:Grace Phelps RN)

Considered Personal Harm/Suicide:  No    (03/25/2017 12:40:Grace Phelps RN)

   

=================================================================

NUTRITIONAL/FUNCTIONAL SCREENING

=================================================================

   

Problem with Appetite >5 Days:  No    (03/25/2017 12:40:Grace hPelps RN)

Chew/Swallow Difficulties:  No    (03/25/2017 12:40:Grace Phelps RN)

Inappropriate Wt Gain/Loss:  No    (03/25/2017 12:40:Grace Phelps RN)

Presence Skin Breakdown/Ulcer:  No    (03/25/2017 12:40:Grace Phelps RN)

Special Diet:  No    (03/25/2017 12:40:Grace Phelps RN)

Pt Requests Dietician Visit:  No    (03/25/2017 12:40:Grace Phelps RN)

Hx of Any of the Following?:  N/A    (03/25/2017 12:40:Grace Phelps RN)

New Diagnosis of:  N/A    (03/25/2017 12:40:Grace Phelps RN)

Requires Assist w/Ambulation:  No    (03/25/2017 12:40:Grace Phelps RN)

Uses Assist Device to Ambulate:  No    (03/25/2017 12:40:Grace Phelps RN)

Pt Requires Help w/ADL's:  No    (03/25/2017 12:40:Grace Phelps RN)

## 2017-03-26 NOTE — L&D CARE PLAN
=================================================================

LD CARE PLANS

=================================================================

Datetime Report Generated by CPN: 2017 18:15

   

   

=================================================================

Datetime: 2017 13:57

=================================================================

   

 State:  Risk For (Grace Phelps RN)

 Related To:  Labor and Delivery Process; Treatment and Procedures

   (Grace Phelps RN)

 Goal(s):  Patients Pain will be Assessed and Managed; Patient will

   Verbalize Adequate Relief of Pain or the Ability to [place] with

   Current Pain (Grace Phelps RN)

 Interventions:  Assess Pain Severity on Scale of 0 (None) to 5

   (Severe); Assess Type, Location and Intensity of Pain Each Time

   Client Reports Discomfort and Notify Provider if Unusal Pain

   Develops; Encourage Proper Breathing and Relaxation Techniques;

   Offer Alternatives Such as Repositioning, Calm Environment,

   Massages, Diversional Activities, Ice Pack, Splinting, and

   Ambulation; Administer Analgesics as Ordered; Assist with Epidural

   Placement as Appropriate; Evaluate Therapeutic Effectiveness of

   Medication and Treatments (Grace Phelps RN)

 Outcome:  Patient will Report Absence or Relief of Pain Consistent

   with Established Pain Goal (Grace Phelps RN)

   Status:  Ongoing (Grace Phelps RN)

 Outcome:  Patient will have a Decrease in Signs and Symptoms of

   Discomfort (Grace Phelps RN)

   Status:  Ongoing (Grace Phelps RN)

 Outcome:  Pain will be Controlled During Procedures (Grace Phelps RN)

   Status:  Ongoing (Grace Phelps RN)

 State:  Risk For (Grace Phelps RN)

 Related To:  Labor and Delivery Process; Medical Interventions (Grace Phelps RN)

 Goal(s):  Patient will have Decreased Anxiety and be able to Function

   at Acceptable Levels (Grace Phelps RN)

 Interventions:  Assess Verbal and Nonverbal  Behavioral Indicators of

   Anxiety; Assist Patient to Identify and Verbalize Symptoms of

   Anxiety; Identify and Demonstrate Techniques to Control Anxiety;

   Assist Patient with Coping Mechanisms to Manage Anxiety; Explain to

   Patient, Using a Calm Reassuring Approach and Nonmedical Terms, All

   Activities, Procedures, and Concerns; Instruct Patient and Family

   about Post Discharge Care, Limitations, Symptoms to Report and

   Resources Available (Grace Phelps RN)

 Outcome:  Patient will Identify, Verbalize and Demonstrate Techniques

   to Control Anxiety (Grace Phelps RN)

   Status:  Ongoing (Grace Phelps RN)

 Outcome:  Patient's Posture, Facial Expressions, Gestures and Activity

   Level will Reflect Decreased Anxiety (Grace Phelps RN)

   Status:  Ongoing (Grace Phelps RN)

 Outcome:  Patient will Verbalize a Sense of Control and/or Acceptance

   of the Situation (Grace Phelps RN)

   Status:  Ongoing (Grace Phelps RN)

 Outcome:  Patient will Identify and Utilize Support Person (Grace Phelps RN)

   Status:  Ongoing (Grace Phelps RN)

 State:  Risk For (Grace Phelps RN)

 Related To:  Labor and Delivery Process; Treatment and Procedures

   (Grace Phelps RN)

 Goal(s):  Patient will Accurately Verbalize Understanding of Plan of

   Care and Treatment; Patient and Family will Accurately Verbalize

   Understanding of the Disease Process (Grace Phelps RN)

 Interventions:  Assess Motivation and Willingness of Patient/Family to

   Learn; Assess Preferred Learning Mode: One to One Instruction,

   Reading, Videos, Group Discussion or Demonstration; Assess Barriers

   to Learning: Pain, Emotional State, Language Barrier, Cognitive

   Impairment, Visual or Hearing Deficits; Assess Patient and Family

   Knowledge of Disease Process, Medications and Treatment; Discuss

   Therapy and/or Treatment Options, Describe Rationale Behind

   Management, Therapy and Treatment Recommendations; Instruct Patient

   and Family on Signs and Symptoms to Report; Instruct Patient and

   Family on Medication Effects and Side Effects; Provide Appropriate

   and Timely Education Using Multiple Techniques; Provide Patient and

   Family with Support Group Information and Resources; Give Clear and

   Thorough Explanations and Demonstrations (Grace Phelps RN)

 Outcome:  Patient and Family will Verbalize Understanding of

   Condition, Treatment and Signs and Symptoms to Report (Grace Phelps RN)

   Status:  Ongoing (Grace Phelps RN)

 Outcome:  Patient will Identify Perceived Learning Needs and Express

   Motivation to Learn (Grace Phelps RN)

   Status:  Ongoing (Grace Phelps RN)

 Outcome:  Patient will Verbalize Understanding of Desired Content,

   and/or Performs Desired Skill Prior to Discharge (Grace Phelps RN)

   Status:  Ongoing (Grace Phelps RN)

 State:  Risk For (Grace Phelps RN)

 Related To:  Surgical Procedures; Invasive Procedures (Grace Phelps RN)

 Goal(s):  The Patient will be Free of Infection, Vital Signs Stable

   and Lab Work within Normal Parameters (Grace Phelps RN)

 Interventions:  Instruct and Reinforce Proper Handwashing, Hygiene,

   and  Care Techniques to Patient and Family; Monitor Vital

   Signs; Monitor Patient for the Following Signs of Infection: Fever,

   Abdominal Tenderness, Unusual Discharge; Monitor Aminiotic Fluid,

   Urine and Lochia for Color and Odor; Observe Wounds, Incisions and

   Invasive Line Sites for Redness, Drainage and Edema; Assess IV Sites

   per Hospital Policy; Monitor Lab and Test Results and Notify

   Provider of Abnormal Findings; Assess Nutritional Status and Promote

   Good Nutrition (Grace Phelps RN)

 Outcome:  Patient will Remain Free of Infection (Grace Phelps RN)

   Status:  Ongoing (Grace Phelps RN)

 Outcome:  Infection will be Recognized Early to Allow for Prompt

   Treatment (Grace Phelps RN)

   Status:  Ongoing (Grace Phelps RN)

 Outcome:  Patient will have Vital Signs Within Expected Range (Grace Phelps RN)

   Status:  Ongoing (Grace Phelps RN)

 State:  Risk For (Grace Phelps RN)

 Related To:  Labor and Delivery Process (Grace Phelps RN)

 Goal(s):  Patient will Remain Free from Injury (Grace Phelps RN)

 Interventions:  Fetal Monitoring as per Hospital Protocol; Assess

   Neurological Status; Perform Risk Assessment of Patients with

   Induction and ; Perform Fall Risk Assessment and Prevention per

   Hospital Protocol; Perform DVT Risk Assessment and Prophylaxis per

   Hospital Protocol; Ensure that Oxygen, Suction, and Resuscitation

   Medications and Equipment are Readily Available; Confirm Patient ID

   Prior to Procedure(s) and Medication Administration per Hospital

   Policy (Grace Phelps RN)

 Outcome:  Successful Fall Risk Prevention (Grace Phelps RN)

   Status:  Ongoing (Grace Phelps RN)

 Outcome:  Patient will Deliver Infant without Adverse [place]ela (Grace Phelps RN)

   Status:  Ongoing (Grace Phelps RN)

 Outcome:  Patient's Neurological Status will Remain Stable (Grace Phelps RN)

   Status:  Ongoing (Grace Phelps RN)

   

=================================================================

Datetime: 2017 13:55

=================================================================

   

 State:  Risk For (Grace Phelps RN)

 Related To:  Labor and Delivery Process; Treatment and Procedures

   (Grace Phelps RN)

 Goal(s):  Patients Pain will be Assessed and Managed; Patient will

   Verbalize Adequate Relief of Pain or the Ability to [place] with

   Current Pain (Grace Phelps RN)

 Interventions:  Assess Pain Severity on Scale of 0 (None) to 5

   (Severe); Assess Type, Location and Intensity of Pain Each Time

   Client Reports Discomfort and Notify Provider if Unusal Pain

   Develops; Encourage Proper Breathing and Relaxation Techniques;

   Offer Alternatives Such as Repositioning, Calm Environment,

   Massages, Diversional Activities, Ice Pack, Splinting, and

   Ambulation; Administer Analgesics as Ordered; Assist with Epidural

   Placement as Appropriate; Evaluate Therapeutic Effectiveness of

   Medication and Treatments (Grace Phelps RN)

 Outcome:  Patient will Report Absence or Relief of Pain Consistent

   with Established Pain Goal (Grace Phelps RN)

   Status:  Ongoing (Grace Phelps RN)

 Outcome:  Patient will have a Decrease in Signs and Symptoms of

   Discomfort (Grace Phelps RN)

   Status:  Ongoing (Grace Phelps RN)

 Outcome:  Pain will be Controlled During Procedures (Grace Phelps RN)

   Status:  Ongoing (Grace Phelps RN)

 State:  Risk For (Grace Phelps RN)

 Related To:  Labor and Delivery Process; Medical Interventions (Grace Phelps RN)

 Goal(s):  Patient will have Decreased Anxiety and be able to Function

   at Acceptable Levels (Grace Phelps RN)

 Interventions:  Assess Verbal and Nonverbal  Behavioral Indicators of

   Anxiety; Assist Patient to Identify and Verbalize Symptoms of

   Anxiety; Identify and Demonstrate Techniques to Control Anxiety;

   Assist Patient with Coping Mechanisms to Manage Anxiety; Explain to

   Patient, Using a Calm Reassuring Approach and Nonmedical Terms, All

   Activities, Procedures, and Concerns; Instruct Patient and Family

   about Post Discharge Care, Limitations, Symptoms to Report and

   Resources Available (Grace Phelps RN)

 Outcome:  Patient will Identify, Verbalize and Demonstrate Techniques

   to Control Anxiety (Grace Phelps RN)

   Status:  Ongoing (Grace Phelps RN)

 Outcome:  Patient's Posture, Facial Expressions, Gestures and Activity

   Level will Reflect Decreased Anxiety (Grace Phelps RN)

   Status:  Ongoing (Grace Phelps RN)

 Outcome:  Patient will Verbalize a Sense of Control and/or Acceptance

   of the Situation (Grace Phelps RN)

   Status:  Ongoing (Grace Phelps RN)

 Outcome:  Patient will Identify and Utilize Support Person (Grace Phelps RN)

   Status:  Ongoing (Grace Phelps RN)

 State:  Risk For (Grace Phelps RN)

 Related To:  Labor and Delivery Process; Treatment and Procedures

   (Grace Phelps RN)

 Goal(s):  Patient will Accurately Verbalize Understanding of Plan of

   Care and Treatment; Patient and Family will Accurately Verbalize

   Understanding of the Disease Process (Grace Phelps RN)

 Interventions:  Assess Motivation and Willingness of Patient/Family to

   Learn; Assess Preferred Learning Mode: One to One Instruction,

   Reading, Videos, Group Discussion or Demonstration; Assess Barriers

   to Learning: Pain, Emotional State, Language Barrier, Cognitive

   Impairment, Visual or Hearing Deficits; Assess Patient and Family

   Knowledge of Disease Process, Medications and Treatment; Discuss

   Therapy and/or Treatment Options, Describe Rationale Behind

   Management, Therapy and Treatment Recommendations; Instruct Patient

   and Family on Signs and Symptoms to Report; Instruct Patient and

   Family on Medication Effects and Side Effects; Provide Appropriate

   and Timely Education Using Multiple Techniques; Provide Patient and

   Family with Support Group Information and Resources; Give Clear and

   Thorough Explanations and Demonstrations (Grace Phelps RN)

 Outcome:  Patient and Family will Verbalize Understanding of

   Condition, Treatment and Signs and Symptoms to Report (Grace Phelps RN)

   Status:  Ongoing (Grace Phelps RN)

 Outcome:  Patient will Identify Perceived Learning Needs and Express

   Motivation to Learn (Grace Phelps RN)

   Status:  Ongoing (Grace Phelps RN)

 Outcome:  Patient will Verbalize Understanding of Desired Content,

   and/or Performs Desired Skill Prior to Discharge (Grace Phelps RN)

   Status:  Ongoing (Grace Phelps RN)

 State:  Risk For (Grace Phelps RN)

 Related To:  Surgical Procedures; Invasive Procedures (Grace Phelps RN)

 Goal(s):  The Patient will be Free of Infection, Vital Signs Stable

   and Lab Work within Normal Parameters (Grace Phelps RN)

 Interventions:  Instruct and Reinforce Proper Handwashing, Hygiene,

   and  Care Techniques to Patient and Family; Monitor Vital

   Signs; Monitor Patient for the Following Signs of Infection: Fever,

   Abdominal Tenderness, Unusual Discharge; Monitor Aminiotic Fluid,

   Urine and Lochia for Color and Odor; Observe Wounds, Incisions and

   Invasive Line Sites for Redness, Drainage and Edema; Assess IV Sites

   per Hospital Policy; Monitor Lab and Test Results and Notify

   Provider of Abnormal Findings; Assess Nutritional Status and Promote

   Good Nutrition (Grace Phelps RN)

 Outcome:  Patient will Remain Free of Infection (Grace Phelps RN)

   Status:  Ongoing (Grace Phelps RN)

 Outcome:  Infection will be Recognized Early to Allow for Prompt

   Treatment (Grace Phelps RN)

   Status:  Ongoing (Grace Phelps RN)

 Outcome:  Patient will have Vital Signs Within Expected Range (Grace Phelps RN)

   Status:  Ongoing (Grace Phelps RN)

 State:  Risk For (Grace Phelps RN)

 Related To:  Labor and Delivery Process (Grace Phelps RN)

 Goal(s):  Patient will Remain Free from Injury (Grace Phelps RN)

 Interventions:  Fetal Monitoring as per Hospital Protocol; Assess

   Neurological Status; Perform Risk Assessment of Patients with

   Induction and ; Perform Fall Risk Assessment and Prevention per

   Hospital Protocol; Perform DVT Risk Assessment and Prophylaxis per

   Hospital Protocol; Ensure that Oxygen, Suction, and Resuscitation

   Medications and Equipment are Readily Available; Confirm Patient ID

   Prior to Procedure(s) and Medication Administration per Hospital

   Policy (Grace Phelps RN)

 Outcome:  Successful Fall Risk Prevention (Grace Phelps RN)

   Status:  Ongoing (Grace Phelps RN)

 Outcome:  Patient will Deliver Infant without Adverse [place] (Grace Phelps RN)

   Status:  Ongoing (Grace Phelps RN)

 Outcome:  Patient's Neurological Status will Remain Stable (Grace Phelps RN)

   Status:  Ongoing (Grace Phelps RN)

## 2017-03-26 NOTE — DELIVERY SUMMARY
=================================================================

Del Sum A-C

=================================================================

Datetime Report Generated by CPN: 2017 02:44

   

   

=================================================================

ADMISSION DATA

=================================================================

   

Chief Complaint:  Suspected Ruptured Membranes

Indication for Induction:  PROM

Admission Impression:  Term, Intrauterine Pregnancy; Ruptured Membranes

Admit Provider Comments:  Term srom this am 0830. pitocin stopped after

   epidural for late decelerations but now has accels and good

   variability. Restart pitocin. gbs neg

   

=================================================================

DELIVERY PERSONNEL

=================================================================

   

Delivery Doctor::  Miracle Torres MD

Labor and Delivery Nurse::  Adry Coronado RN

Labor and Delivery Nurse::  Lona Canchola RN

Nursery Nurse::  KAYLA Carroll Tech/CNA:  Lina Marinelli, ST

   

=================================================================

MATERNAL INFORMATION

=================================================================

   

Delivery Anesthesia:  Epidural

Medications After Delivery:  Pitocin Bolus-Please Comment

Meds After Delivery Comment:  Pitocin 20 units/1000 ml NS bolus

   following placenta

Estimated  Blood Loss (ml):  350

Maternal Complications:  None

Provider Comments:   over perineal lac w repair. live male infant

   ap8/9. peds present for thick meconium. spontaneous intact placenta

   3vc

   

=================================================================

LABOR SUMMARY

=================================================================

   

EDC:  2017 00:00

No. Babies in Womb:  1

 Attempted:  No

Labor Anesthesia:  Epidural

   

=================================================================

LABOR INFORMATION

=================================================================

   

Reason for Induction:  Not Applicable

Onset of Labor:  2017 19:15

Complete Dilatation:  2017 21:31

Oxytocin:  Augmentation

Group B Beta Strep:  negative

Antibiotics # of Doses:  0

Antibiotics Time of Last Dose:  n/a

Steroids Given:  None

Reason Steroids Not Administered:  Not Applicable

   

=================================================================

MEMBRANES

=================================================================

   

Membranes Rupture Method:  Spontaneous

Rupture of Membranes:  2017 08:00

Length of Rupture (hr):  16.67

Amniotic Fluid Color:  Particulate Meconium

Amniotic Fluid Amount:  Small

Amniotic Fluid Odor:  Normal

   

=================================================================

STAGES OF LABOR

=================================================================

   

Stage 1 hr:  26

Stage 1 min:  16

Stage 2 hr:  -20

Stage 2 min:  -51

Stage 3 hr:  0

Stage 3 min:  3

Total Time in Labor hr:  5

Total Time in Labor min:  28

   

=================================================================

VAGINAL DELIVERY

=================================================================

   

Laceration Extension:  First Degree

Laceration Type:  Perineal

Laceration Repair:  Yes

Laceration Repair Note:  1st deg perineal /vaginal lac repaired with

   short running stitch. 

Sponge Count Correct:  N/A

Sharps Count Correct:  N/A

   

=================================================================

CSECTION DELIVERY

=================================================================

   

Primary Indication:  N/A

Secondary Indication:  N/A

CSection Incidence:  N/A

Labor:  N/A

Elective:  N/A

CSection Incision:  N/A

   

=================================================================

BABY A INFORMATION

=================================================================

   

Infant Delivery Date/Time:  2017 00:40

Method of Delivery:  Vaginal

Born in Route :  No

:  N/A

Forceps:  N/A

Vacuum Extraction:  N/A

Shoulder Dystocia :  No

   

=================================================================

PRESENTATION/POSITION BABY A

=================================================================

   

Presentation:  Cephalic

Cephalic Presentation:  Vertex

Vertex Position:  Right Occipital Anterior

Breech Presentation:  N/A

   

=================================================================

PLACENTA INFORMATION BABY A

=================================================================

   

Placenta Delivery Time :  2017 00:43

Placenta Method of Delivery:  Spontaneous

Placenta Status:  Delivered

   

=================================================================

APGAR SCORES BABY A

=================================================================

   

Heart Rate 1 min:  >100 bpm

Resp Effort 1 min:  Good Cry

Reflex Irritability 1 min:  Cough or Sneeze or Pulls Away

Muscle Tone 1 min:  Active Motion

Color 1 min:  Blue/Pale

Resuscitation Effort 1 min:  Tactile Stimulation

APGAR SCORE 1 MIN:  8

Heart Rate 5 min:  >100 bpm

Resp Effort 5 min:  Good Cry

Reflex Irritability 5 min:  Cough or Sneeze or Pulls Away

Muscle Tone 5 min:  Active Motion

Color 5 min:  Body Pink, Extremities Blue

Resuscitation Effort 5 min:  Tactile Stimulation

APGAR SCORE 5 MIN:  9

   

=================================================================

INFANT INFORMATION BABY A

=================================================================

   

Gestational Age at Delivery:  40.2

Gestational Status:  Full Term- 39- 40.6 Weeks

Infant Outcome :  Liveborn

Infant Condition :  Stable

Infant Sex:  Male

   

=================================================================

IDENTIFICATION BABY A

=================================================================

   

Infant Verification Date/Time:  2017 00:48

ID Band Number:  [id number]

Mother's Name Verified:  Yes

Infant Medical Record Number:  [id number]

RN Verifying Infant:  KAlesia Hart RN

Additional Verifying Personnel:  B. Ring, RN

   

=================================================================

WEIGHT/LENGTH BABY A

=================================================================

   

Infant Birthweight (gm):  4128

Infant Weight (lb):  9

Infant Weight (oz):  2

Infant Length (in):  20.00

Infant Length (cm):  50.80

   

=================================================================

CORD INFORMATION BABY A

=================================================================

   

No. Cord Vessels:  3

Nuchal Cord :  N/A

Cord Blood Taken:  Yes-For Storage (Mom's Blood type +)

Infant Suction:  None

   

=================================================================

ASSESSMENT BABY A

=================================================================

   

Infant Complications:  Meconium

Physical Findings at Delivery:  Within Normal Limits

Physical Findings- Other:  intial assessment to be performed by 

   nursery who is at bedside

Infant Respirations:  Appears Normal

Skin to Skin:  Yes

Skin to Skin Time (min):  60

Neonatologist/ALS Called :  No

Infant Care By:  ELENO Corea RN

Transferred To:  Remains with Mother

   

=================================================================

SIGNATURES

=================================================================

   

Signature:  Electronically signed by Miracle Torres MD (ASHLEIGH) on

   3/26/2017 at 00:59  with User ID: [email or web address]

## 2017-03-26 NOTE — L&D FLOW SHEET
=================================================================

LD Flowsheet

=================================================================

Datetime Report Generated by CPN: 2017 07:00

   

   

=================================================================

Datetime: 2017 02:30

=================================================================

   

 NBP Sys/Zandra/Mean (mmHg):  128 (QS system process)

:  59 (QS system process)

:  83 (QS system process)

 Pulse:  88 (QS system process)

   

=================================================================

Datetime: 2017 01:56

=================================================================

   

 NBP Sys/Zandra/Mean (mmHg):  138 (QS system process)

:  81 (QS system process)

:  104 (QS system process)

 Pulse:  101 (QS system process)

 Respirations:  15 (Adry Rogeliowood, RN)

   

=================================================================

Datetime: 2017 00:45

=================================================================

   

Stage of Pregnancy:  Recovery (Adry Coronado, RN)

 Temperature (F):  99.1 (Adry Mercadowood, RN)

 Temperature (C):  37.3 (QS system process)

 Temperature Route:  Oral (Adry Coronado, RN)

 Pain Scale:  0 (Adry Mercadowood, RN)

   

=================================================================

Datetime: 2017 00:44

=================================================================

   

 NBP Sys/Zandra/Mean (mmHg):  124 (QS system process)

:  65 (QS system process)

:  89 (QS system process)

 Pulse:  107 (QS system process)

 Respirations:  14 (Adry Ledgerwood, RN)

 LaborFlag:  Labor (QS system process)

   

=================================================================

Datetime: 2017 00:40

=================================================================

   

 Stage 2 Comments:   of vital male baby. (Adry Coronado, RN)

   

=================================================================

Datetime: 2017 00:30

=================================================================

   

 NBP Sys/Zandra/Mean (mmHg):  144 (QS system process)

:  81 (QS system process)

:  106 (QS system process)

 Pulse:  113 (QS system process)

 Monitor Mode:  External (Adry Coronado, KAYLA)

 Frequency (min):  1.5-3 (Adry Coronado, RN)

 Quality:  Moderate to Strong (Adry Coronado, RN)

 Duration (sec):  50-60 (Adry Kvnggerdayan, RN)

 Duration Criteria:  Less than Two 120 Second Contractions (Adry Coronado, RN)

 Pattern:  Normal: <= 5 Contractions in 10 Minutes (Adry Coronado, RN)

 Resting Tone (Palpate):  Relaxed (Adry Coronado, RN)

 Monitor Mode:  External US (Adry Coronado, RN)

 FHR Baseline Rate :  155 (Adry Coronado, RN)

 FHR Baseline Changes:  No Baseline Change (Adry Coronado, RN)

 Variability:  Minimal - Undetectable to <=5 bpm (Adry Calderongerdayan, RN)

 Accelerations:  None (Adry Kvnggerwood, RN)

 Decelerations:  None (Adry Coronado, RN)

 Pitocin (milliunit):  Pitocin Remains (milliunits) @ 4 (Adry

   Ledgerwood, RN)

 LaborFlag:  Labor (QS system process)

   

=================================================================

Datetime: 2017 00:15

=================================================================

   

 Monitor Mode:  External (Adry Ledgerwood, RN)

 Frequency (min):  1.5-3 (Adry Ledgerwood, RN)

 Quality:  Moderate to Strong (Adry Ledgerwood, RN)

 Duration (sec):  60-80 (Adry Ledgerwood, RN)

 Duration Criteria:  Less than Two 120 Second Contractions (Adry

   Ledgerwood, RN)

 Pattern:  Normal: <= 5 Contractions in 10 Minutes (Adry Ledgerwood, RN)

 Resting Tone (Palpate):  Relaxed (Adry Ledgerwood, RN)

 Monitor Mode:  External US (Adry Ledgerwood, RN)

 FHR Baseline Rate :  155 (Adry Ledgerwood, RN)

 FHR Baseline Changes:  No Baseline Change (Adry Ledgerwood, RN)

 Variability:  Minimal - Undetectable to <=5 bpm (Adry Ledgerwood, RN)

 Accelerations:  None (Adry Ledgerwood, RN)

 Decelerations:  None (Adry Ledgerwood, RN)

 Pitocin (milliunit):  Pitocin Remains (milliunits) @ (Annotations: 4)

   (Adry Ledgerwood, RN)

   

=================================================================

Datetime: 2017 00:13

=================================================================

   

 NBP Sys/Zandra/Mean (mmHg):  125 (QS system process)

:  64 (QS system process)

:  87 (QS system process)

 Pulse:  105 (QS system process)

 LaborFlag:  Labor (QS system process)

   

=================================================================

Datetime: 2017 00:10

=================================================================

   

 Communication Comments:  Dr Torres at bedside (Adry Ledgerwood, RN)

   

=================================================================

Datetime: 2017 00:07

=================================================================

   

 Pitocin (milliunit):  Pitocin Increased to (milliunits) @ 4 ([facility]

   [facility])

   

=================================================================

Datetime: 2017 00:00

=================================================================

   

 Monitor Mode:  External; Palpation (Adry Ledgerwood, RN)

 Frequency (min):  1.5-4.5 (Adry Ledgerwood, RN)

 Quality:  Moderate to Strong (Adry Ledgerwood, RN)

 Duration (sec):  60-90 (Adry Ledgerwood, RN)

 Duration Criteria:  Less than Two 120 Second Contractions (Adry

   Ledgerwood, RN)

 Pattern:  Normal: <= 5 Contractions in 10 Minutes (Adry Ledgerwood, RN)

 Resting Tone (Palpate):  Relaxed (Adry Ledgerwood, RN)

 Monitor Mode:  External US (Adry Ledgerwood, RN)

 FHR Baseline Rate :  155 (Adry Ledgerwood, RN)

 FHR Baseline Changes:  No Baseline Change (Adry Ledgerwood, RN)

 Variability:  Minimal - Undetectable to <=5 bpm (Adry Ledgerwood, RN)

 Accelerations:  None (Adry Ledgerwood, RN)

 Decelerations:  None (Adry Ledgerwood, RN)

 Pitocin (milliunit):  Pitocin Remains (milliunits) @ 2 (Adry

   Ledgerwood, RN)

   

=================================================================

Datetime: 2017 23:58

=================================================================

   

 NBP Sys/Zandra/Mean (mmHg):  126 (QS system process)

:  78 (QS system process)

:  93 (QS system process)

 Pulse:  111 (QS system process)

 LaborFlag:  Labor (QS system process)

   

=================================================================

Datetime: 2017 23:57

=================================================================

   

 Pushing Position:  Pushing with Contractions (Adry Ledgerwood, RN)

 Pushing Progress:  Molding Noted; Pushing Effectively with

   Contractions (Adry Ledgerwood, RN)

   

=================================================================

Datetime: 2017 23:50

=================================================================

   

 Pitocin (milliunit):  Pitocin Started (milliunits) @ 2 (Adry

   Ledgerwood, RN)

   

=================================================================

Datetime: 2017 23:45

=================================================================

   

 Communication:  Provider at Bedside (Adry Ledgerwood, RN)

 Communication Comments:  Dr Torres at bedside (Adry Coronado RN)

   

=================================================================

Datetime: 2017 23:43

=================================================================

   

 NBP Sys/Zandra/Mean (mmHg):  130 (QS system process)

:  59 (QS system process)

:  85 (QS system process)

 Pulse:  114 (QS system process)

 LaborFlag:  Labor (QS system process)

   

=================================================================

Datetime: 2017 23:30

=================================================================

   

 Monitor Mode:  External; Palpation (Adry Coronado RN)

 Frequency (min):  1.5-4.5 (Adry Coronado, RN)

 Quality:  Moderate to Strong (Adry Coronado, RN)

 Duration (sec):  50-90 (Adry Coronado, KAYLA)

 Duration Criteria:  Less than Two 120 Second Contractions (Adry Coronado, RN)

 Pattern:  Normal: <= 5 Contractions in 10 Minutes (Adry Coronado, RN)

 Resting Tone (Palpate):  Relaxed (Adry Coronado, RN)

 Monitor Mode:  External US (Adry Coronado RN)

 FHR Baseline Rate :  155 (Adry Ledgerwood, RN)

 FHR Baseline Changes:  No Baseline Change (Adry Coronado, RN)

 Variability:  Moderate 6-25 bpm (Adry Ledgerwood, RN)

 Accelerations:  15X15 (Adry Calderongerdayan, RN)

 Decelerations:  None (Adry Coronado, RN)

 Pushing Position:  Pushing with Contractions (Adry Coronado, RN)

 Pushing Progress:  Molding Noted; Ineffective Pushing (Adry Coronado, RN)

 Stage 2 Comments:  RN at bedside continuously assessing FHRs while pt.

   pushing with contractions. (Adry Coronado, RN)

   

=================================================================

Datetime: 2017 23:29

=================================================================

   

 NBP Sys/Zandra/Mean (mmHg):  119 (QS system process)

:  66 (QS system process)

:  86 (QS system process)

 Pulse:  115 (QS system process)

 LaborFlag:  Labor (QS system process)

   

=================================================================

Datetime: 2017 23:14

=================================================================

   

 NBP Sys/Zandra/Mean (mmHg):  142 (QS system process)

:  65 (QS system process)

:  94 (QS system process)

 Pulse:  116 (QS system process)

 LaborFlag:  Labor (QS system process)

   

=================================================================

Datetime: 2017 23:11

=================================================================

   

 Pushing Position:  Pushing with Contractions (Adry Ledgerwood, RN)

 Pushing Progress:  Molding Noted; Pushing Effectively with

   Contractions (Adry Ledgerwood, RN)

   

=================================================================

Datetime: 2017 23:00

=================================================================

   

 Monitor Mode:  External; Palpation (Adry Coronado, RN)

 Frequency (min):  2.5-5 (Adry Ledgerwood, RN)

 Quality:  Moderate (Adry Ledgerwood, RN)

 Duration (sec):  50-70 (Adry Ledgerwood, RN)

 Duration Criteria:  More than Two 120 Second or Greater Contractions

   (Adry Ledgerwood, RN)

 Pattern:  Normal: <= 5 Contractions in 10 Minutes (Adry Ledgerwood, RN)

 Resting Tone (Palpate):  Relaxed (Adry Ledgerwood, RN)

 Monitor Mode:  External US (Adry Kvnggerwood, RN)

 FHR Baseline Rate :  145 (Adry Ledgerwood, RN)

 FHR Baseline Changes:  No Baseline Change (Adry Ledgerwood, RN)

 Variability:  Moderate 6-25 bpm (Adry Ledgerwood, RN)

 Accelerations:  15X15 (Adry Ledgerwood, RN)

 Decelerations:  Late (Adry Ledgerwood, RN)

   

=================================================================

Datetime: 2017 22:58

=================================================================

   

 NBP Sys/Zandra/Mean (mmHg):  131 (QS system process)

:  63 (QS system process)

:  90 (QS system process)

 Pulse:  106 (QS system process)

 LaborFlag:  Labor (QS system process)

   

=================================================================

Datetime: 2017 22:44

=================================================================

   

 Pushing:  Coached on Pushing (Adry Ledgerwood, RN)

   

=================================================================

Datetime: 2017 22:43

=================================================================

   

 NBP Sys/Zandra/Mean (mmHg):  131 (QS system process)

:  64 (QS system process)

:  89 (QS system process)

 Pulse:  97 (QS system process)

 LaborFlag:  Labor (QS system process)

   

=================================================================

Datetime: 2017 22:30

=================================================================

   

 Monitor Mode:  External (Adry Ledgerwood, RN)

 Frequency (min):  1.5-4.5 (Adry Ledgerwood, RN)

 Quality:  Moderate (Adry Ledgerwood, RN)

 Duration (sec):  60-80 (Adry Ledgerwood, RN)

 Duration Criteria:  Less than Two 120 Second Contractions (Adry

   Ledgerwood, RN)

 Pattern:  Normal: <= 5 Contractions in 10 Minutes (Adry Ledgerwood, RN)

 Resting Tone (Palpate):  Relaxed (Adry Ledgerwood, RN)

 Monitor Mode:  External US (Adry Ledgerwood, RN)

 FHR Baseline Rate :  145 (Adry Ledgerwood, RN)

 FHR Baseline Changes:  No Baseline Change (Adry Ledgerwood, RN)

 Variability:  Moderate 6-25 bpm (Adry Ledgerwood, RN)

 Accelerations:  10X10 (Adry Ledgerwood, RN)

 Decelerations:  Late (Adry Ledgerwood, RN)

   

=================================================================

Datetime: 2017 22:29

=================================================================

   

 NBP Sys/Zandra/Mean (mmHg):  133 (QS system process)

:  65 (QS system process)

:  92 (QS system process)

 Pulse:  96 (QS system process)

 LaborFlag:  Labor (QS system process)

   

=================================================================

Datetime: 2017 22:14

=================================================================

   

 NBP Sys/Zandra/Mean (mmHg):  135 (QS system process)

:  67 (QS system process)

:  93 (QS system process)

 Pulse:  97 (QS system process)

 LaborFlag:  Labor (QS system process)

   

=================================================================

Datetime: 2017 22:00

=================================================================

   

 Monitor Mode:  External (Adry Ledgerwood, RN)

 Frequency (min):  1-4 (Adry Ledgerdayan, RN)

 Quality:  Moderate (Adry Ledgerwood, RN)

 Duration (sec):  50-80 (Adry Ledgerwood, RN)

 Duration Criteria:  Less than Two 120 Second Contractions (Adry

   Ledgerwood, RN)

 Pattern:  Normal: <= 5 Contractions in 10 Minutes (Adry Ledgerwood, RN)

 Resting Tone (Palpate):  Relaxed (Adry Ledgerwood, RN)

 Monitor Mode:  External US (Adry Ledgerwood, RN)

 FHR Baseline Rate :  145 (Adry Ledgerwood, RN)

 FHR Baseline Changes:  No Baseline Change (Adry Ledgerwood, RN)

 Variability:  Moderate 6-25 bpm (Adry Ledgerwood, RN)

 Accelerations:  None (Adry Ledgerwood, RN)

 Decelerations:  Late (Adry Ledgerwood, RN)

   

=================================================================

Datetime: 2017 21:59

=================================================================

   

 NBP Sys/Zandra/Mean (mmHg):  127 (QS system process)

:  60 (QS system process)

:  86 (QS system process)

 Pulse:  93 (QS system process)

 LaborFlag:  Labor (QS system process)

   

=================================================================

Datetime: 2017 21:43

=================================================================

   

 NBP Sys/Zandra/Mean (mmHg):  125 (QS system process)

:  70 (QS system process)

:  91 (QS system process)

 Pulse:  96 (QS system process)

 LaborFlag:  Labor (QS system process)

   

=================================================================

Datetime: 2017 21:33

=================================================================

   

 Patient Care Comments:  ultrosound obtained at the bedside (Adry

   Ledgerwood, RN)

   

=================================================================

Datetime: 2017 21:32

=================================================================

   

 Communication:  Provider Orders Received (Adry Calderongerwood, RN)

 Communication Comments:  Dr Torres requests ultrosound at the bedside.

   (Adry Ledgerwood, RN)

   

=================================================================

Datetime: 2017 21:31

=================================================================

   

 Dilatation (cm):  10.0 (Adry Ledgerwood, RN)

 Effacement (%):  100 (Adry Ledgerwood, RN)

 Station:  1 (Adry Ledgerwood, RN)

 Exam by:  Dr Torres (Adry Ledgerwood, RN)

   

=================================================================

Datetime: 2017 21:30

=================================================================

   

 Monitor Mode:  External (Adry Ledgerwood, RN)

 Frequency (min):  1-3 (Adry Ledgerwood, RN)

 Quality:  Moderate (Adry Ledgerwood, RN)

 Duration (sec):  50-80 (Adry Ledgerwood, RN)

 Duration Criteria:  Less than Two 120 Second Contractions (Adry

   Ledgerwood, RN)

 Pattern:  Normal: <= 5 Contractions in 10 Minutes (Adry Ledgerwood, RN)

 Resting Tone (Palpate):  Relaxed (Adry Ledgerwood, RN)

 Monitor Mode:  External US (Adry Ledgerwood, RN)

 FHR Baseline Rate :  145 (Adry Ledgerwood, RN)

 FHR Baseline Changes:  No Baseline Change (Adry Ledgerwood, RN)

 Variability:  Moderate 6-25 bpm (Adry Ledgerwood, RN)

 Accelerations:  10X10 (Adry Ledgerwood, RN)

 Decelerations:  Late (Adry Ledgerwood, RN)

   

=================================================================

Datetime: 2017 21:29

=================================================================

   

 Communication:  Provider at Bedside (Adry Calderongerwood, RN)

 Communication Comments:  Dr Torres at bedside (Adry Kvnggerwood, RN)

   

=================================================================

Datetime: 2017 21:28

=================================================================

   

 NBP Sys/Zandra/Mean (mmHg):  119 (QS system process)

:  73 (QS system process)

:  91 (QS system process)

 Pulse:  102 (QS system process)

 LaborFlag:  Labor (QS system process)

   

=================================================================

Datetime: 2017 21:14

=================================================================

   

 NBP Sys/Zandra/Mean (mmHg):  116 (QS system process)

:  73 (QS system process)

:  87 (QS system process)

 Pulse:  90 (QS system process)

 LaborFlag:  Labor (QS system process)

   

=================================================================

Datetime: 2017 21:00

=================================================================

   

 Monitor Mode:  External (Adry Ledgerwood, RN)

 Frequency (min):  1-4 (Adry Ledgerwood, RN)

 Quality:  Moderate (Adry Ledgerwood, RN)

 Duration (sec):  50-90 (Adry Ledgerwood, RN)

 Duration Criteria:  Less than Two 120 Second Contractions (Adry

   Ledgerwood, RN)

 Pattern:  Normal: <= 5 Contractions in 10 Minutes (Adry Ledgerwood, RN)

 Resting Tone (Palpate):  Relaxed (Adry Ledgerwood, RN)

 Monitor Mode:  External US (Adry Ledgerwood, RN)

 FHR Baseline Rate :  145 (Adry Ledgerwood, RN)

 FHR Baseline Changes:  No Baseline Change (Adry Ledgerwood, RN)

 Variability:  Moderate 6-25 bpm (Adry Ledgerwood, RN)

 Accelerations:  15X15 (Adry Ledgerwood, RN)

 Decelerations:  Late (Adry Ledgerwood, RN)

   

=================================================================

Datetime: 2017 20:58

=================================================================

   

 NBP Sys/Zandra/Mean (mmHg):  119 (QS system process)

:  61 (QS system process)

:  82 (QS system process)

 Pulse:  86 (QS system process)

 LaborFlag:  Labor (QS system process)

   

=================================================================

Datetime: 2017 20:43

=================================================================

   

 NBP Sys/Zandra/Mean (mmHg):  110 (QS system process)

:  60 (QS system process)

:  80 (QS system process)

 Pulse:  89 (QS system process)

 LaborFlag:  Labor (QS system process)

   

=================================================================

Datetime: 2017 20:30

=================================================================

   

 Monitor Mode:  External (Adry Coronado, RN)

 Frequency (min):  1.5-4.5 (Adry Ledgerwood, RN)

 Quality:  Mild/Moderate (Adry Ledgerwood, RN)

 Duration (sec):  40-80 (Adry Ledgerwood, RN)

 Duration Criteria:  Less than Two 120 Second Contractions (Adry

   Ledgerwood, RN)

 Pattern:  Normal: <= 5 Contractions in 10 Minutes (Adry Ledgerwood, RN)

 Resting Tone (Palpate):  Relaxed (Adry Ledgerwood, RN)

 Monitor Mode:  External US (Adry Coronado, RN)

 FHR Baseline Rate :  145 (Adry Coronado, RN)

 FHR Baseline Changes:  No Baseline Change (Adry Kvnggerwood, RN)

 Variability:  Moderate 6-25 bpm (Adry Ledgerwood, RN)

 Accelerations:  None (Adry Ledgerwood, RN)

 Decelerations:  Late (Adry Ledgerwood, RN)

   

=================================================================

Datetime: 2017 20:29

=================================================================

   

 NBP Sys/Zandra/Mean (mmHg):  114 (QS system process)

:  66 (QS system process)

:  85 (QS system process)

 Pulse:  81 (QS system process)

 LaborFlag:  Labor (QS system process)

   

=================================================================

Datetime: 2017 20:15

=================================================================

   

 Monitor Mode:  External (Adry Ledgerwood, RN)

 Frequency (min):  2-3 (Adry Ledgerwood, RN)

 Quality:  Mild/Moderate (Adry Ledgerwood, RN)

 Duration (sec):  50-70 (Adry Ledgerwood, RN)

 Duration Criteria:  Less than Two 120 Second Contractions (Adry

   Ledgerwood, RN)

 Pattern:  Normal: <= 5 Contractions in 10 Minutes (Adry Ledgerwood, RN)

 Resting Tone (Palpate):  Relaxed (Adry Ledgerwood, RN)

 Monitor Mode:  External US (Adry Kvnggerwood, RN)

 FHR Baseline Rate :  140 (Adry Ledgerwood, RN)

 FHR Baseline Changes:  No Baseline Change (Adry Ledgerwood, RN)

 Variability:  Moderate 6-25 bpm (Adry Ledgerwood, RN)

 Accelerations:  None (Adry Ledgerwood, RN)

 Decelerations:  Late (Adry Ledgerwood, RN)

   

=================================================================

Datetime: 2017 20:14

=================================================================

   

 NBP Sys/Zandra/Mean (mmHg):  109 (QS system process)

:  61 (QS system process)

:  79 (QS system process)

 Pulse:  85 (QS system process)

 LaborFlag:  Labor (QS system process)

   

=================================================================

Datetime: 2017 20:04

=================================================================

   

 Actions for Fetal Decelerations:  Provider Reviewed Strip (Adry

   Ledgerwood, RN)

   

=================================================================

Datetime: 2017 20:00

=================================================================

   

 NBP Sys/Zandra/Mean (mmHg):  110 (QS system process)

:  64 (QS system process)

:  77 (QS system process)

 Pulse:  85 (QS system process)

 Monitor Mode:  External (Adry Ledgerwood, RN)

 Frequency (min):  1.5-2.5 (Adry Ledgerwood, RN)

 Quality:  Mild/Moderate (Adry Ledgerwood, RN)

 Duration (sec):  60-70 (Adry Ledgerwood, RN)

 Duration Criteria:  Less than Two 120 Second Contractions (Adry

   Ledgerwood, RN)

 Pattern:  Normal: <= 5 Contractions in 10 Minutes (Adry Ledgerwood, RN)

 Resting Tone (Palpate):  Relaxed (Adry Ledgerwood, RN)

 Monitor Mode:  External US (Adry Ledgerwood, RN)

 FHR Baseline Rate :  135 (Adry Ledgerwood, RN)

 FHR Baseline Changes:  No Baseline Change (Adry Ledgerwood, RN)

 Variability:  Minimal - Undetectable to <=5 bpm (Adry Ledgerwood, RN)

 Accelerations:  None (Adry Ledgerwood, RN)

 Decelerations:  Late (Adry Ledgerwood, RN)

 Pitocin (milliunit):  Pitocin Discontinued (Adry Ledgerwood, RN)

 LaborFlag:  Labor (QS system process)

   

=================================================================

Datetime: 2017 19:50

=================================================================

   

 Patient Position/Activity:  Left Lateral (Adyr Ledgerwood, RN)

   

=================================================================

Datetime: 2017 19:45

=================================================================

   

 Monitor Mode:  External (Adry Ledgerwood, RN)

 Frequency (min):  1.5-3.5 (Adry Ledgerwood, RN)

 Quality:  Mild/Moderate (Adry Ledgerwood, RN)

 Duration (sec):  50-70 (Adry Ledgerwood, RN)

 Duration Criteria:  Less than Two 120 Second Contractions (Adry

   Ledgerwood, RN)

 Pattern:  Normal: <= 5 Contractions in 10 Minutes (Adry Ledgerwood, RN)

 Resting Tone (Palpate):  Relaxed (Adry Ledgerwood, RN)

 Monitor Mode:  External US (Adry Ledgerwood, RN)

 FHR Baseline Rate :  135 (Adry Ledgerwood, RN)

 FHR Baseline Changes:  No Baseline Change (Adry Ledgerwood, RN)

 Variability:  Minimal - Undetectable to <=5 bpm (Adry Ledgerwood, RN)

 Accelerations:  None (Adry Ledgerwood, RN)

 Decelerations:  Late (Adry Ledgerwood, RN)

 Pitocin (milliunit):  Pitocin Remains (milliunits) @ 12 (Adry

   Ledgerwood, RN)

   

=================================================================

Datetime: 2017 19:43

=================================================================

   

 NBP Sys/Zandra/Mean (mmHg):  122 (QS system process)

:  89 (QS system process)

:  101 (QS system process)

 Pulse:  93 (QS system process)

 LaborFlag:  Labor (QS system process)

   

=================================================================

Datetime: 2017 19:30

=================================================================

   

 Monitor Mode:  External (Adry Ledgerwood, RN)

 Frequency (min):  1.5-3.5 (Adry Ledgerwood, RN)

 Quality:  Mild/Moderate (Adry Ledgerwood, RN)

 Duration (sec):  50-90 (Adry Ledgerwood, RN)

 Duration Criteria:  Less than Two 120 Second Contractions (Adry

   Ledgerwood, RN)

 Pattern:  Normal: <= 5 Contractions in 10 Minutes (Adry Ledgerwood, RN)

 Resting Tone (Palpate):  Relaxed (Adry Ledgerwood, RN)

 Monitor Mode:  External US (Adry Ledgerwood, RN)

 FHR Baseline Changes:  No Baseline Change (Adry Ledgerwood, RN)

 Variability:  Moderate 6-25 bpm (Adry Ledgerwood, RN)

 Accelerations:  None (Adry Ledgerwood, RN)

 Decelerations:  Late (Adry Ledgerwood, RN)

 Pitocin (milliunit):  Pitocin Remains (milliunits) @ 12 (Adry

   Ledgerwood, RN)

   

=================================================================

Datetime: 2017 19:24

=================================================================

   

 Pain Scale:  0 (Adry Coronado RN)

 Pain Presence:  None/Denies (Adry Coronado RN)

 Vaginal Bleeding:  None (Adry Coronado RN)

 Level of Consciousness:  Fully Conscious (Adry Coronado, RN)

 DTR's/Clonus:  DTRs 2+; No Clonus (Adry Coronado, KAYLA)

 Headache:  Denies (Adry Coronado RN)

 Breath Sounds, Left:  Clear and Equal (Adry Coronado RN)

 Breath Sounds, Right:  Clear and Equal (Adry Coronado, RN)

 Nausea/Vomiting:  Denies (Adry Coronado RN)

 RUQ Epigastric Pain:  Denies (Adry Coronado, KAYLA)

 Instructional Method:  Demo; Verbal; Patient Instructed (Adry Coronado RN)

 Plan of Care:  Plan of Care Discussed; Vaginal Delivery (Adry Coronado RN)

 LaborFlag:  Labor (QS system process)

   

=================================================================

Datetime: 2017 19:23

=================================================================

   

 NBP Sys/Zandra/Mean (mmHg):  141 (QS system process)

:  73 (QS system process)

:  101 (QS system process)

 Pulse:  107 (QS system process)

 LaborFlag:  Labor (QS system process)

   

=================================================================

Datetime: 2017 19:22

=================================================================

   

 NBP Sys/Zandra/Mean (mmHg):  134 (QS system process)

:  88 (QS system process)

:  107 (QS system process)

 Pulse:  105 (QS system process)

 LaborFlag:  Labor (QS system process)

   

=================================================================

Datetime: 2017 19:21

=================================================================

   

 Communication:  Report Given to @ O. Ledgerwood, RN (Grace Phelps

   RN)

 Communication Comments:  report received from JOSE Phelps RN at

   bedside ([facility])

   

=================================================================

Datetime: 2017 19:20

=================================================================

   

 NBP Sys/Zandra/Mean (mmHg):  128 (QS system process)

:  75 (QS system process)

:  96 (QS system process)

 Pulse:  103 (QS system process)

 LaborFlag:  Labor (QS system process)

   

=================================================================

Datetime: 2017 19:19

=================================================================

   

 NBP Sys/Zandra/Mean (mmHg):  133 (QS system process)

:  63 (QS system process)

:  90 (QS system process)

 Pulse:  88 (QS system process)

 I/O Interventions:  Zhou Cath Inserted (Adry Coronado RN)

 Patient Care Comments:  by JOSE Phelps RN (Adry Coronado RN)

 LaborFlag:  Labor (QS system process)

   

=================================================================

Datetime: 2017 19:18

=================================================================

   

 NBP Sys/Zandra/Mean (mmHg):  138 (QS system process)

:  63 (QS system process)

:  90 (QS system process)

 Pulse:  106 (QS system process)

 LaborFlag:  Labor (QS system process)

   

=================================================================

Datetime: 2017 19:17

=================================================================

   

 NBP Sys/Zandra/Mean (mmHg):  123 (QS system process)

:  65 (QS system process)

:  89 (QS system process)

 Pulse:  96 (QS system process)

 LaborFlag:  Labor (QS system process)

   

=================================================================

Datetime: 2017 19:16

=================================================================

   

 NBP Sys/Zandra/Mean (mmHg):  133 (QS system process)

:  69 (QS system process)

:  100 (QS system process)

 Pulse:  101 (QS system process)

 LaborFlag:  Labor (QS system process)

   

=================================================================

Datetime: 2017 19:15

=================================================================

   

 NBP Sys/Zandra/Mean (mmHg):  145 (QS system process)

:  73 (QS system process)

:  105 (QS system process)

 Pulse:  89 (QS system process)

 Monitor Mode:  External (Adry Coronado, KAYLA)

 Frequency (min):  UTD (Adry Coronado, RN)

 Resting Tone (Palpate):  Relaxed (Adry Coronado, RN)

 Contraction Comments:  pt just finished with Epidural (Adry Coronado, RN)

 Monitor Mode:  External US (Adry Coronado, RN)

 FHR Baseline Rate :  130 (Adry Coronado, RN)

 FHR Baseline Changes:  No Baseline Change (Adry Coronado, RN)

 Variability:  Moderate 6-25 bpm (Adry Coronado, RN)

 Accelerations:  10X10 (Adry Coronado, RN)

 Decelerations:  None (Adry Coronado, KAYLA)

 Dilatation (cm):  4.0 (Nellie Naidu RN)

 Effacement (%):  90 (Nellie Naidu, RN)

 Station:  0 (Nellie Naidu, KAYLA)

 Exam by:  ELENO Phelps RN (Nellie Naidu, KAYLA)

 Pitocin (milliunit):  Pitocin Remains (milliunits) @ 12 (Adry Coronado, RN)

 LaborFlag:  Labor (QS system process)

   

=================================================================

Datetime: 2017 19:14

=================================================================

   

 NBP Sys/Zandra/Mean (mmHg):  143 (QS system process)

:  65 (QS system process)

:  93 (QS system process)

 Pulse:  107 (QS system process)

 LaborFlag:  Labor (QS system process)

   

=================================================================

Datetime: 2017 19:12

=================================================================

   

 NBP Sys/Zandra/Mean (mmHg):  156 (QS system process)

:  67 (QS system process)

:  97 (QS system process)

 Pulse:  94 (QS system process)

 LaborFlag:  Labor (QS system process)

   

=================================================================

Datetime: 2017 19:11

=================================================================

   

 NBP Sys/Zandra/Mean (mmHg):  145 (QS system process)

:  70 (QS system process)

:  98 (QS system process)

 Pulse:  103 (QS system process)

 LaborFlag:  Labor (QS system process)

   

=================================================================

Datetime: 2017 19:10

=================================================================

   

 NBP Sys/Zandra/Mean (mmHg):  137 (QS system process)

:  62 (QS system process)

:  93 (QS system process)

 Pulse:  101 (QS system process)

 LaborFlag:  Labor (QS system process)

   

=================================================================

Datetime: 2017 19:09

=================================================================

   

 NBP Sys/Zandra/Mean (mmHg):  145 (QS system process)

:  66 (QS system process)

:  95 (QS system process)

 Pulse:  92 (QS system process)

 LaborFlag:  Labor (QS system process)

   

=================================================================

Datetime: 2017 19:07

=================================================================

   

 NBP Sys/Zandra/Mean (mmHg):  142 (QS system process)

:  75 (QS system process)

:  103 (QS system process)

 Pulse:  97 (QS system process)

 LaborFlag:  Labor (QS system process)

   

=================================================================

Datetime: 2017 19:06

=================================================================

   

 NBP Sys/Zandra/Mean (mmHg):  142 (QS system process)

:  74 (QS system process)

:  103 (QS system process)

 Pulse:  93 (QS system process)

 Epidural Procedure Other:  Pump Started (Grace Phelps RN)

 LaborFlag:  Labor (QS system process)

   

=================================================================

Datetime: 2017 19:05

=================================================================

   

 NBP Sys/Zandra/Mean (mmHg):  130 (QS system process)

:  69 (QS system process)

:  93 (QS system process)

 Pulse:  90 (QS system process)

 LaborFlag:  Labor (QS system process)

   

=================================================================

Datetime: 2017 19:04

=================================================================

   

 NBP Sys/Zandra/Mean (mmHg):  145 (QS system process)

:  65 (QS system process)

:  102 (QS system process)

 Pulse:  90 (QS system process)

 Epidural Procedure:  Loading Dose (Grace Phelps RN)

 LaborFlag:  Labor (QS system process)

   

=================================================================

Datetime: 2017 19:02

=================================================================

   

 NBP Sys/Zandra/Mean (mmHg):  153 (QS system process)

:  96 (QS system process)

:  116 (QS system process)

 Pulse:  91 (QS system process)

 LaborFlag:  Labor (QS system process)

   

=================================================================

Datetime: 2017 19:01

=================================================================

   

 NBP Sys/Zandra/Mean (mmHg):  151 (QS system process)

:  93 (QS system process)

:  116 (QS system process)

 Pulse:  92 (QS system process)

 Pulse:  85 (QS system process)

 SpO2 (%):  98 (QS system process)

 LaborFlag:  Labor (QS system process)

   

=================================================================

Datetime: 2017 19:00

=================================================================

   

 NBP Sys/Zandra/Mean (mmHg):  140 (QS system process)

:  75 (QS system process)

:  98 (QS system process)

 Pulse:  89 (QS system process)

 Monitor Mode:  External; Palpation (Nellie Naidu RN)

 Monitor Interventions for UA:  [place] Adjusted (Cheryle Fan RN)

 Frequency (min):  2-4 (Nellie Naidu RN)

 Quality:  Mild (Cheryle Fan RN)

 Quality:  Mild/Moderate (Nellie Naidu RN)

 Duration (sec):  40-70 (Nellie Naidu RN)

 Resting Tone (Palpate):  Relaxed (Cheryle Fan RN)

 Resting Tone (Palpate):  Relaxed (Nellie Naidu RN)

 Contraction Comments:  RN @ bedside adjusting toco (Cheryle Fan RN)

 Monitor Mode:  External US (Nellie Naidu RN)

 FHR Baseline Rate :  130 (Nellie Naidu RN)

 FHR Baseline Changes:  No Baseline Change (Cheryle Fan RN)

 Variability:  Moderate 6-25 bpm (Nellie Naidu RN)

 Accelerations:  15X15 (Nellie Naidu RN)

 Decelerations:  None (Nellie Naidu RN)

 Pitocin (milliunit):  Pitocin Remains (milliunits) @ 12 (Grace Phelps RN)

 Pitocin (milliunit):  Pitocin Remains (milliunits) @ (Annotations: 12)

   (Grace Phelps RN)

 Epidural Procedure:  Test Dose (Nellie Naidu RN)

 LaborFlag:  Labor (QS system process)

## 2017-03-27 LAB
ERYTHROCYTE [DISTWIDTH] IN BLOOD BY AUTOMATED COUNT: 14.4 % (ref 11.5–14)
HCT VFR BLD CALC: 31.5 % (ref 36–47)
HGB BLD-MCNC: 10.5 G/DL (ref 12–15.5)
HGB HCT DIFFERENCE: 0
MCH RBC QN AUTO: 26.7 PG (ref 27–33.4)
MCHC RBC AUTO-ENTMCNC: 33.3 G/DL (ref 32–36)
MCV RBC AUTO: 80 FL (ref 80–97)
RBC # BLD AUTO: 3.93 10^6/UL (ref 3.72–5.28)
WBC # BLD AUTO: 13.3 10^3/UL (ref 4–10.5)

## 2017-03-27 RX ADMIN — SENNOSIDES, DOCUSATE SODIUM SCH EACH: 50; 8.6 TABLET, FILM COATED ORAL at 10:14

## 2017-03-27 RX ADMIN — FERROUS SULFATE TAB 325 MG (65 MG ELEMENTAL FE) SCH MG: 325 (65 FE) TAB at 10:14

## 2017-03-27 RX ADMIN — DOCUSATE SODIUM SCH MG: 100 CAPSULE, LIQUID FILLED ORAL at 10:14

## 2017-03-27 RX ADMIN — FERROUS SULFATE TAB 325 MG (65 MG ELEMENTAL FE) SCH MG: 325 (65 FE) TAB at 17:54

## 2017-03-27 RX ADMIN — IBUPROFEN SCH MG: 800 TABLET, FILM COATED ORAL at 14:02

## 2017-03-27 RX ADMIN — IBUPROFEN SCH MG: 800 TABLET, FILM COATED ORAL at 05:37

## 2017-03-27 RX ADMIN — PRENATAL W/O A VIT W/ FE FUMARATE-FA CAP 106.5-1 MG SCH CAP: 106.5 CAPSULE ORAL at 10:14

## 2017-03-27 RX ADMIN — IBUPROFEN SCH MG: 800 TABLET, FILM COATED ORAL at 22:01

## 2017-03-27 RX ADMIN — DOCUSATE SODIUM SCH MG: 100 CAPSULE, LIQUID FILLED ORAL at 17:54

## 2017-03-27 NOTE — L&D GENERAL ADMISSION
=================================================================

General Admit

=================================================================

Datetime Report Generated by CPN: 2017 06:00

   

   

=================================================================

PREGNANCY INFORMATION

=================================================================

   

Patient Age:  21    (02/15/2017 14:33:QS system process)

EDC:  2017 00:00    (02/15/2017 14:44:ERLINDA Reinoso)

:  1    (02/15/2017 14:44:ERLINDA Reinoso)

Para:  0    (02/15/2017 14:44:ERLINDA Reinoso)

Term:  0    (02/15/2017 14:44:ERLINDA Reinoso)

:  0    (02/15/2017 14:44:ERLINDA Reinoso)

Spontaneous Abortions:  0    (02/15/2017 14:44:ERLINDA Reinoso)

Induced Abortions:  0    (02/15/2017 14:44:ERLINDA Reinoso)

Livin    (02/15/2017 14:44:ERLINDA Reinoso)

Cesareans:  0    (02/15/2017 14:44:ERLINDA Reinoso)

VBACs:  0    (02/15/2017 14:44:ERLINDA Reinoso)

Ectopic:  0    (02/15/2017 14:44:ERLINDA Reinoso)

Multiple Births:  0    (02/15/2017 14:44:ERLINDA Reinoso)

Baby, Number in Womb:  1    (02/15/2017 14:44:ERLINDA Reinoso)

   

=================================================================

PRENATAL CARE

=================================================================

   

Primary Obstetrician:  [facility]    (02/15/2017

   14:44:Geneva Christianson RN)

Month of 1st Prenatal Visit:  2016    (02/15/2017 14:44:Grace Phelps RN)

Adequate Prenatal Care:  Yes    (02/15/2017 14:44:ERLINDA Reinoso)

Height (in):  67    (2017 03:42:QS system process)

   

=================================================================

ALLERGIES

=================================================================

   

Medication Allergy:  No    (02/15/2017 14:44:ERLINDA Reinoso)

Medication Allergies:  No Known Allergies (2017)    (2017

   08:25:QS system process)

Latex Allergy:  No Latex Allergies    (02/15/2017 14:44:ERLINDA Reionso)

Food Allergies:  jalepenos    (02/15/2017 14:44:Grace Phelps RN)

   

=================================================================

COMMUNICATION

=================================================================

   

Primary Language:  English    (02/15/2017 14:44:ERLINDA Reinoso)

Medical Tx Preferred Language:  English    (02/15/2017 14:44:Grace Phelps RN)

   

=================================================================

DEMOGRAPHICS

=================================================================

   

Address:  [address] [place] [address]   [address]    (02/15/2017 14:33:QS system process)

Zipcode:  [address]    (02/15/2017 14:33:QS system process)

Home Telephone:  [phone]    (02/15/2017 14:33:QS system process)

Work Telephone:  [phone]    (02/15/2017 14:33:QS system process)

SSN:      (02/15/2017 14:33:QS system process)

Next of Kin Name:  KENIA MORSE    (02/15/2017 14:33:QS system process)

Next of Kin Phone:  [phone]    (02/15/2017 14:33:QS system

   process)

Next of Kin Relationship:  MO    (02/15/2017 14:33:QS system process)

YOB: 1995    (02/15/2017 14:33:QS system process)

Marital Status:  Single    (02/15/2017 14:33:QS system process)

Sex:  Female    (02/15/2017 14:33:QS system process)

Race:      (02/15/2017 14:33:QS system process)

Ethnicity:  Non- or     (02/15/2017 14:33:QS system

   process)

Roman Catholic:  Orthodoxy    (02/15/2017 14:33:QS system process)

   

=================================================================

DRUG AND ALCOHOL USE

=================================================================

   

Alcohol:  No    (02/15/2017 14:44:ERLINDA Reinoso)

Cigarettes:  Former Smoker. [id number]    (02/15/2017 14:44:ERLINDA Reinoso)

Marijuana:  No    (02/15/2017 14:44:ERLINDA Reinoso)

Cocaine:  No    (02/15/2017 14:44:ERLINDA Reinoso)

Other Illicit Drugs:  No    (02/15/2017 14:44:ERLINDA Reinoso)

   

=================================================================

VACCINE HISTORY

=================================================================

   

Influenza Vaccine:  Yes    (02/15/2017 14:44:Grace Phelps RN)

   

=================================================================



=================================================================

   

Feeding Preference:  Breast    (02/15/2017 14:44:Grace Phelps RN)

Benefit of Breast Feed Discussed:  Yes    (02/15/2017 14:44:Adry Coronado RN)

Circumcision:  Yes    (02/15/2017 14:44:Grace Phelps RN)

Prenatal Classes Attended:  Yes    (02/15/2017 14:44:Grace Phelps RN)

Pain Management Plans:  Medications    (02/15/2017 14:44:Grace Phelps RN)

Plans for Labor and Delivery:  None    (02/15/2017 14:44:Grace Phelps RN)

Support Person:  Barry Kamara    (02/15/2017 14:44:rGace Phelps RN)

Support Person Relationship:  Significant Other    (02/15/2017

   14:44:Grace Phelps RN)

Cultural/Spritual Practice:  No    (02/15/2017 14:44:Grace Phelps RN)

Spir/Cult Dietary Needs:  No    (02/15/2017 14:44:Grace Phelps RN)

   

=================================================================

LIVING SITUATION/DISCHARGE PLAN

=================================================================

   

Living Arrangements:  House    (02/15/2017 14:44:Grace Phelps RN)

Adequate Access to::  Electric; Heat; Refrigeration; Plumbing/Running

   water; Phone; Transportation    (02/15/2017 14:44:Grace Phelps RN)

WIC Program:  Yes    (02/15/2017 14:44:Grace Phelps RN)

Discharge  Person:  Barry Holderomnia    (02/15/2017 14:44:Grace Phelps RN)

Person to Help after Discharge:  Barry Crisomnia    (02/15/2017

   14:44:Grace Phelps RN)

Currently Using Commun Resources:  Yes    (02/15/2017 14:44:Grace Phelps RN)

Specify Current Resource Used:  Medicaid    (02/15/2017 14:44:Grace Phelps RN)

Car Seat for Discharge:  Yes    (02/15/2017 14:44:Grace Phelps RN)

   

=================================================================

PRENATAL LABS

=================================================================

   

Blood Type:  A Positive    (02/15/2017 14:44:Grace Phelps RN)

Hemoglobin:  11.5 L    (2017 12:25:QS system process)

Hematocrit:  34.8 L    (2017 12:25:QS system process)

MCV:  79 L    (2017 12:25:QS system process)

Group Beta Strep:  negative    (02/15/2017 14:44:Grace Phelps RN)

Chlamydia:  Negative    (02/15/2017 14:44:Grace Phelps RN)

RPR/VDRL:  Nonreactive    (02/15/2017 14:44:Grace Phelps RN)

HIV Exposure Test:  Negative    (02/15/2017 14:44:Grace Phelps RN)

Hepatitis B:  Negative    (02/15/2017 14:44:Grace Phelps RN)

Rubella:  Immune    (02/15/2017 14:44:Grace Phelps RN)

Varicella:  Susceptible    (02/15/2017 14:44:Grace Phelps RN)

   

=================================================================

OB/PREVIOUS PREGNANCY HISTORY

=================================================================

   

History of Previous :  No    (02/15/2017 14:44:Grace Phelps RN)

History of Gestational Diabetes:  No    (02/15/2017 14:44:Grace Phelps RN)

History of PIH:  No    (02/15/2017 14:44:Grace Phelps RN)

History of Incompetent Cervix:  No    (02/15/2017 14:44:Grace Phelps RN)

History of Placenta Previa/Abrup:  No    (02/15/2017 14:44:Grace Phelps RN)

History of Macrosomia:  No    (02/15/2017 14:44:Grace Phelps RN)

History of IUGR:  No    (02/15/2017 14:44:Grace Phelps RN)

History of Postpartum Hemorrhage:  No    (02/15/2017 14:44:Grace Phelps RN)

History of Loss/Stillborn:  No    (02/15/2017 14:44:Grace Phelps RN)

History of  Death:  No    (02/15/2017 14:44:Grace Phelps RN)

History of D (Rh) Sensitization:  No    (02/15/2017 14:44:Grace Phelps RN)

History Recurrent Loss/Stillborn:  No    (02/15/2017 14:44:Grace Phelps RN)

History Depression/PP Depression:  No    (02/15/2017 14:44:Grace Phelps RN)

History of  Uterine Anomaly/MERI:  No    (02/15/2017 14:44:Grace Phelps RN)

History of Infertility:  No    (02/15/2017 14:44:Grace Phelps RN)

History of  ART Treatment:  No    (02/15/2017 14:44:Grace Phelps RN)

History of MERI:  No    (02/15/2017 14:44:Grace Phelps RN)

Comments Obstetrical History:  G1 - current pregnancy

   Infant has mass in abdomen monitored by [facility]    (02/15/2017 14:44:Grace Phelps RN)

   

=================================================================

MEDICAL HISTORY

=================================================================

   

Med Hx Diabetes:  No    (02/15/2017 14:44:Grace Phelps RN)

Med Hx Hypertension:  No    (02/15/2017 14:44:Graec Phelps RN)

Med Hx Heart Disease:  No    (02/15/2017 14:44:Grace Phelps RN)

Med Hx Autoimmune Disorder:  No    (02/15/2017 14:44:Grace Phelps RN)

Med Hx Kidney Disease/UTI:  No    (02/15/2017 14:44:Grace Phelps RN)

Med Hx Neurologic/Epilepsy:  No    (02/15/2017 14:44:Grace Phelps RN)

Med Hx Psychiatric Disorders:  No    (02/15/2017 14:44:Grace Phelps RN)

Med Hx Hepatitis/Liver Disease:  No    (02/15/2017 14:44:Grace Phleps RN)

Med Hx Varicosities/Phlebitis:  No    (02/15/2017 14:44:Grace Phelps RN)

Med Hx Thyroid Dysfunction:  No    (02/15/2017 14:44:Grace Phelps RN)

Med Hx Trauma/Violence:  No    (02/15/2017 14:44:Grace Phelps RN)

Med Hx Blood Transfusion:  No    (02/15/2017 14:44:Grace Phelps RN)

Med Hx Pulmonary (Asthma,TB):  No    (02/15/2017 14:44:Grace Phelps RN)

Med Hx Breast:  No    (02/15/2017 14:44:Grace Phelps RN)

Med Hx GYN Surgery:  No    (02/15/2017 14:44:Grace Phelps RN)

Med Hx Hospitalization/Surgery:  Yes    (02/15/2017 14:44:Grace Phelps RN)

Med Hx Anesthetic Complications:  No    (02/15/2017 14:44:Grace Phelps RN)

Med Hx Abnormal Pap Smear:  No    (02/15/2017 14:44:Grace Phelps RN)

Other Medical Diseases:  No    (02/15/2017 14:44:Grace Phelps RN)

Med Hx Significant Family Hx:  No    (02/15/2017 14:44:Grace Phelps RN)

Details of Med/Surg Hx:  Hospitalized for possible appendicitis   

   (02/15/2017 14:44:Grace Phelps RN)

   

=================================================================

INFECTIOUS HISTORY

=================================================================

   

Inf Hx Gonorrhea:  No    (02/15/2017 14:44:Grace Phelps RN)

Inf Hx Chlamydia:  No    (02/15/2017 14:44:Grace Phelps RN)

Inf Hx Syphilis:  No    (02/15/2017 14:44:Grace Phelps RN)

Inf Hx HIV/AIDS:  No    (02/15/2017 14:44:Grace Phelps RN)

Inf Hx Human Papilloma Virus:  No    (02/15/2017 14:44:Grace Phelps RN)

Inf Hx Pt/Partner Genital Herpes:  No    (02/15/2017 14:44:Grace Phelps RN)

Inf Hx Tuberculosis/Exposure:  No    (02/15/2017 14:44:Grace Phelps RN)

Inf Hx Hepatitis B,C:  No    (02/15/2017 14:44:Grace Phelps RN)

Inf Hx Rash or Viral Illness:  No    (02/15/2017 14:44:Grace Phelps RN)

   

=================================================================

GENETIC HISTORY

=================================================================

   

Gen Hx Age >=35 at WARR[place]:  No    (02/15/2017 14:44:Grace Phelps RN)

Gen Hx Thalassemia:  No    (02/15/2017 14:44:Grace Phelps RN)

Gen Hx Congenital Heart Defect:  No    (02/15/2017 14:44:Grace Phelps RN)

Gen Hx Neural Tube Defect:  No    (02/15/2017 14:44:Grace Phelps RN)

Gen Hx Down's Syndrome:  No    (02/15/2017 14:44:Grace Phelps RN)

Gen Hx Pop-Sachs:  No    (02/15/2017 14:44:Grace Phelps RN)

Gen Hx [place]:  No    (02/15/2017 14:44:Grace Phelps RN)

Gen Hx Familial Dysautonomia:  No    (02/15/2017 14:44:Grace Phelps RN)

Gen Hx Sickle Cell Disease/Trait:  No    (02/15/2017 14:44:Grace Phelps RN)

Gen Hx Hemophilia/Blood Disorder:  No    (02/15/2017 14:44:Grace Phelps RN)

Gen Hx Muscular Dystrophy:  No    (02/15/2017 14:44:Grace Phelps RN)

Gen Hx Cystic Fibrosis:  No    (02/15/2017 14:44:Grace Phelps RN)

Gen Hx Huntingtons Chorea:  No    (02/15/2017 14:44:Grace Phelps RN)

Gen Hx Mental Retardation/Autism:  No    (02/15/2017 14:44:Grace Phelps RN)

Gen Hx Tested for Fragile X:  No    (02/15/2017 14:44:Grace Phelps RN)

Gen Hx Other Inher/Chromosomal:  No    (02/15/2017 14:44:Grace Phelps RN)

Gen Hx Maternal Metabolic DO:  No    (02/15/2017 14:44:Grace Phelps RN)

Gen Hx Pt Father or FOB Defect:  No    (02/15/2017 14:44:Grace Phelps RN)

Gen Hx Other Genetic History:  No    (02/15/2017 14:44:Grace Phelps RN)

Gen Hx Drugs/Meds since LMP:  No    (02/15/2017 14:44:Grace Phelps RN)

## 2017-03-27 NOTE — L&D CARE PLAN
=================================================================

LD CARE PLANS

=================================================================

Datetime Report Generated by CPN: 2017 06:15

   

   

=================================================================

Datetime: 2017 13:57

=================================================================

   

 State:  Risk For (Grace Phelps RN)

 Related To:  Labor and Delivery Process; Treatment and Procedures

   (Grace Phelps RN)

 Goal(s):  Patients Pain will be Assessed and Managed; Patient will

   Verbalize Adequate Relief of Pain or the Ability to [place] with

   Current Pain (Grace Phelps RN)

 Interventions:  Assess Pain Severity on Scale of 0 (None) to 5

   (Severe); Assess Type, Location and Intensity of Pain Each Time

   Client Reports Discomfort and Notify Provider if Unusal Pain

   Develops; Encourage Proper Breathing and Relaxation Techniques;

   Offer Alternatives Such as Repositioning, Calm Environment,

   Massages, Diversional Activities, Ice Pack, Splinting, and

   Ambulation; Administer Analgesics as Ordered; Assist with Epidural

   Placement as Appropriate; Evaluate Therapeutic Effectiveness of

   Medication and Treatments (Grace Phelps RN)

 Outcome:  Patient will Report Absence or Relief of Pain Consistent

   with Established Pain Goal (Grace Phelps RN)

   Status:  Ongoing (Grace Phelps RN)

 Outcome:  Patient will have a Decrease in Signs and Symptoms of

   Discomfort (Grace Phelps RN)

   Status:  Ongoing (Grace Phelps RN)

 Outcome:  Pain will be Controlled During Procedures (Grace Phelps RN)

   Status:  Ongoing (Grace Phelps RN)

 State:  Risk For (Grace Phelps RN)

 Related To:  Labor and Delivery Process; Medical Interventions (Grace Phelps RN)

 Goal(s):  Patient will have Decreased Anxiety and be able to Function

   at Acceptable Levels (Grace Phelps RN)

 Interventions:  Assess Verbal and Nonverbal  Behavioral Indicators of

   Anxiety; Assist Patient to Identify and Verbalize Symptoms of

   Anxiety; Identify and Demonstrate Techniques to Control Anxiety;

   Assist Patient with Coping Mechanisms to Manage Anxiety; Explain to

   Patient, Using a Calm Reassuring Approach and Nonmedical Terms, All

   Activities, Procedures, and Concerns; Instruct Patient and Family

   about Post Discharge Care, Limitations, Symptoms to Report and

   Resources Available (Grace Phelps RN)

 Outcome:  Patient will Identify, Verbalize and Demonstrate Techniques

   to Control Anxiety (Grace Phelps RN)

   Status:  Ongoing (Grace Phelps RN)

 Outcome:  Patient's Posture, Facial Expressions, Gestures and Activity

   Level will Reflect Decreased Anxiety (Grace Phelps RN)

   Status:  Ongoing (rGace Phelps RN)

 Outcome:  Patient will Verbalize a Sense of Control and/or Acceptance

   of the Situation (Grace Phelps RN)

   Status:  Ongoing (Grace Phelps RN)

 Outcome:  Patient will Identify and Utilize Support Person (Grace Phelps RN)

   Status:  Ongoing (Grace Phelps RN)

 State:  Risk For (Grace Phelps RN)

 Related To:  Labor and Delivery Process; Treatment and Procedures

   (Grace Phelps RN)

 Goal(s):  Patient will Accurately Verbalize Understanding of Plan of

   Care and Treatment; Patient and Family will Accurately Verbalize

   Understanding of the Disease Process (Grace Phelps RN)

 Interventions:  Assess Motivation and Willingness of Patient/Family to

   Learn; Assess Preferred Learning Mode: One to One Instruction,

   Reading, Videos, Group Discussion or Demonstration; Assess Barriers

   to Learning: Pain, Emotional State, Language Barrier, Cognitive

   Impairment, Visual or Hearing Deficits; Assess Patient and Family

   Knowledge of Disease Process, Medications and Treatment; Discuss

   Therapy and/or Treatment Options, Describe Rationale Behind

   Management, Therapy and Treatment Recommendations; Instruct Patient

   and Family on Signs and Symptoms to Report; Instruct Patient and

   Family on Medication Effects and Side Effects; Provide Appropriate

   and Timely Education Using Multiple Techniques; Provide Patient and

   Family with Support Group Information and Resources; Give Clear and

   Thorough Explanations and Demonstrations (Grace Phelps RN)

 Outcome:  Patient and Family will Verbalize Understanding of

   Condition, Treatment and Signs and Symptoms to Report (Grace Phelps RN)

   Status:  Ongoing (Grace Phelps RN)

 Outcome:  Patient will Identify Perceived Learning Needs and Express

   Motivation to Learn (Grace Phelps RN)

   Status:  Ongoing (Grace Phelps RN)

 Outcome:  Patient will Verbalize Understanding of Desired Content,

   and/or Performs Desired Skill Prior to Discharge (Grace Phelps RN)

   Status:  Ongoing (Grace Phelps RN)

 State:  Risk For (Grace Phelps RN)

 Related To:  Surgical Procedures; Invasive Procedures (Grace Phelps RN)

 Goal(s):  The Patient will be Free of Infection, Vital Signs Stable

   and Lab Work within Normal Parameters (Grace Phelps RN)

 Interventions:  Instruct and Reinforce Proper Handwashing, Hygiene,

   and  Care Techniques to Patient and Family; Monitor Vital

   Signs; Monitor Patient for the Following Signs of Infection: Fever,

   Abdominal Tenderness, Unusual Discharge; Monitor Aminiotic Fluid,

   Urine and Lochia for Color and Odor; Observe Wounds, Incisions and

   Invasive Line Sites for Redness, Drainage and Edema; Assess IV Sites

   per Hospital Policy; Monitor Lab and Test Results and Notify

   Provider of Abnormal Findings; Assess Nutritional Status and Promote

   Good Nutrition (Grace Phelps RN)

 Outcome:  Patient will Remain Free of Infection (Grace Phelps RN)

   Status:  Ongoing (Grace Phelps RN)

 Outcome:  Infection will be Recognized Early to Allow for Prompt

   Treatment (Grace Phelps RN)

   Status:  Ongoing (Grace Phelps RN)

 Outcome:  Patient will have Vital Signs Within Expected Range (Grace Phelps RN)

   Status:  Ongoing (Grace Phelps RN)

 State:  Risk For (Grace Phelps RN)

 Related To:  Labor and Delivery Process (Grace Phelps RN)

 Goal(s):  Patient will Remain Free from Injury (Grace Phelps RN)

 Interventions:  Fetal Monitoring as per Hospital Protocol; Assess

   Neurological Status; Perform Risk Assessment of Patients with

   Induction and ; Perform Fall Risk Assessment and Prevention per

   Hospital Protocol; Perform DVT Risk Assessment and Prophylaxis per

   Hospital Protocol; Ensure that Oxygen, Suction, and Resuscitation

   Medications and Equipment are Readily Available; Confirm Patient ID

   Prior to Procedure(s) and Medication Administration per Hospital

   Policy (Grace Phelps RN)

 Outcome:  Successful Fall Risk Prevention (Grace Phelps RN)

   Status:  Ongoing (Grace Phelps RN)

 Outcome:  Patient will Deliver Infant without Adverse Sequela (Grace Phelps RN)

   Status:  Ongoing (Grace Phelps RN)

 Outcome:  Patient's Neurological Status will Remain Stable (Grcae Phelps RN)

   Status:  Ongoing (Grace Phelps RN)

   

=================================================================

Datetime: 2017 13:55

=================================================================

   

 State:  Risk For (Grace Phelps RN)

 Related To:  Labor and Delivery Process; Treatment and Procedures

   (Grace Phelps RN)

 Goal(s):  Patients Pain will be Assessed and Managed; Patient will

   Verbalize Adequate Relief of Pain or the Ability to [place] with

   Current Pain (Grace Phelps RN)

 Interventions:  Assess Pain Severity on Scale of 0 (None) to 5

   (Severe); Assess Type, Location and Intensity of Pain Each Time

   Client Reports Discomfort and Notify Provider if Unusal Pain

   Develops; Encourage Proper Breathing and Relaxation Techniques;

   Offer Alternatives Such as Repositioning, Calm Environment,

   Massages, Diversional Activities, Ice Pack, Splinting, and

   Ambulation; Administer Analgesics as Ordered; Assist with Epidural

   Placement as Appropriate; Evaluate Therapeutic Effectiveness of

   Medication and Treatments (Grace Phelps RN)

 Outcome:  Patient will Report Absence or Relief of Pain Consistent

   with Established Pain Goal (Grace Phelps RN)

   Status:  Ongoing (Grace Phelps RN)

 Outcome:  Patient will have a Decrease in Signs and Symptoms of

   Discomfort (Grace Phelps RN)

   Status:  Ongoing (Grace Phelps RN)

 Outcome:  Pain will be Controlled During Procedures (Grace Phelps RN)

   Status:  Ongoing (Grace Phelps RN)

 State:  Risk For (Grace Phelps RN)

 Related To:  Labor and Delivery Process; Medical Interventions (Grace Phelps RN)

 Goal(s):  Patient will have Decreased Anxiety and be able to Function

   at Acceptable Levels (Grace Phelps RN)

 Interventions:  Assess Verbal and Nonverbal  Behavioral Indicators of

   Anxiety; Assist Patient to Identify and Verbalize Symptoms of

   Anxiety; Identify and Demonstrate Techniques to Control Anxiety;

   Assist Patient with Coping Mechanisms to Manage Anxiety; Explain to

   Patient, Using a Calm Reassuring Approach and Nonmedical Terms, All

   Activities, Procedures, and Concerns; Instruct Patient and Family

   about Post Discharge Care, Limitations, Symptoms to Report and

   Resources Available (Grace Phelps RN)

 Outcome:  Patient will Identify, Verbalize and Demonstrate Techniques

   to Control Anxiety (Grace Phelps RN)

   Status:  Ongoing (Grace Phelps RN)

 Outcome:  Patient's Posture, Facial Expressions, Gestures and Activity

   Level will Reflect Decreased Anxiety (Grace Phelps RN)

   Status:  Ongoing (Grace Phelps RN)

 Outcome:  Patient will Verbalize a Sense of Control and/or Acceptance

   of the Situation (Grace Phelps RN)

   Status:  Ongoing (Grace Phelps RN)

 Outcome:  Patient will Identify and Utilize Support Person (Grace Phelps RN)

   Status:  Ongoing (Grace Phelps RN)

 State:  Risk For (Grace Phelps RN)

 Related To:  Labor and Delivery Process; Treatment and Procedures

   (Grace Phelps RN)

 Goal(s):  Patient will Accurately Verbalize Understanding of Plan of

   Care and Treatment; Patient and Family will Accurately Verbalize

   Understanding of the Disease Process (Grace Phelps RN)

 Interventions:  Assess Motivation and Willingness of Patient/Family to

   Learn; Assess Preferred Learning Mode: One to One Instruction,

   Reading, Videos, Group Discussion or Demonstration; Assess Barriers

   to Learning: Pain, Emotional State, Language Barrier, Cognitive

   Impairment, Visual or Hearing Deficits; Assess Patient and Family

   Knowledge of Disease Process, Medications and Treatment; Discuss

   Therapy and/or Treatment Options, Describe Rationale Behind

   Management, Therapy and Treatment Recommendations; Instruct Patient

   and Family on Signs and Symptoms to Report; Instruct Patient and

   Family on Medication Effects and Side Effects; Provide Appropriate

   and Timely Education Using Multiple Techniques; Provide Patient and

   Family with Support Group Information and Resources; Give Clear and

   Thorough Explanations and Demonstrations (Grace Phelps RN)

 Outcome:  Patient and Family will Verbalize Understanding of

   Condition, Treatment and Signs and Symptoms to Report (Grace Phelps RN)

   Status:  Ongoing (Grace Phelps RN)

 Outcome:  Patient will Identify Perceived Learning Needs and Express

   Motivation to Learn (Grace Phelps RN)

   Status:  Ongoing (Grace Phelps RN)

 Outcome:  Patient will Verbalize Understanding of Desired Content,

   and/or Performs Desired Skill Prior to Discharge (Grace Phelps RN)

   Status:  Ongoing (Grace Phelps RN)

 State:  Risk For (Grace Phelps RN)

 Related To:  Surgical Procedures; Invasive Procedures (Grace Phelps RN)

 Goal(s):  The Patient will be Free of Infection, Vital Signs Stable

   and Lab Work within Normal Parameters (Grace Phelps RN)

 Interventions:  Instruct and Reinforce Proper Handwashing, Hygiene,

   and  Care Techniques to Patient and Family; Monitor Vital

   Signs; Monitor Patient for the Following Signs of Infection: Fever,

   Abdominal Tenderness, Unusual Discharge; Monitor Aminiotic Fluid,

   Urine and Lochia for Color and Odor; Observe Wounds, Incisions and

   Invasive Line Sites for Redness, Drainage and Edema; Assess IV Sites

   per Hospital Policy; Monitor Lab and Test Results and Notify

   Provider of Abnormal Findings; Assess Nutritional Status and Promote

   Good Nutrition (Grace Phelps RN)

 Outcome:  Patient will Remain Free of Infection (Grace Phelps RN)

   Status:  Ongoing (Grace Phelps RN)

 Outcome:  Infection will be Recognized Early to Allow for Prompt

   Treatment (Grace Phelps RN)

   Status:  Ongoing (Grace Phelps RN)

 Outcome:  Patient will have Vital Signs Within Expected Range (Grace Phelps RN)

   Status:  Ongoing (Grace Phelps RN)

 State:  Risk For (Grace Phelps RN)

 Related To:  Labor and Delivery Process (Grace Phelps RN)

 Goal(s):  Patient will Remain Free from Injury (Grace Phelps RN)

 Interventions:  Fetal Monitoring as per Hospital Protocol; Assess

   Neurological Status; Perform Risk Assessment of Patients with

   Induction and ; Perform Fall Risk Assessment and Prevention per

   Hospital Protocol; Perform DVT Risk Assessment and Prophylaxis per

   Hospital Protocol; Ensure that Oxygen, Suction, and Resuscitation

   Medications and Equipment are Readily Available; Confirm Patient ID

   Prior to Procedure(s) and Medication Administration per Hospital

   Policy (Grace Phelps RN)

 Outcome:  Successful Fall Risk Prevention (Grace Phelps RN)

   Status:  Ongoing (Grace Phelps RN)

 Outcome:  Patient will Deliver Infant without Adverse Sequela (Grace Phelps RN)

   Status:  Ongoing (Grace Phelps RN)

 Outcome:  Patient's Neurological Status will Remain Stable (Grace Phelps RN)

   Status:  Ongoing (Grace Phelps RN)

## 2017-03-27 NOTE — L&D CURRENT ADMISSION
=================================================================

Current Admit

=================================================================

Datetime Report Generated by CPN: 03/27/2017 06:00

   

   

=================================================================

ADMISSION INFORMATION

=================================================================

   

Current Admit Date/Time:  03/25/2017 12:40    (03/25/2017 12:40:Grace Phelps RN)

Reason for Admission:  Rupture of Membranes    (03/25/2017 12:40:Grace Phelps RN)

Chief Complaint:  Suspected Rupture of Membranes    (03/25/2017

   12:40:Grace Phelps RN)

Medications During Pregnancy:  Prenatal Vitamin    (03/25/2017

   12:40:Grace Phelps RN)

EGA per Dates:  40.1    (03/25/2017 12:40:QS system process)

Method of Arrival:  Wheelchair    (03/23/2017 08:37:Grace Phelps RN)

Reason for Induction:  Premature Rupture of Membranes    (03/25/2017

   12:40:Grace Phelps RN)

Prenatal Records Available:  Yes    (03/25/2017 12:40:Grace Phelps RN)

General Admission Information:  Reviewed; Updated    (03/25/2017

   12:40:Grace Phelps RN)

General Admission Reviewed By:  ELENO Phelps RN    (03/25/2017

   12:40:Grace Phelps RN)

   

=================================================================

BELONGINGS/ADVANCED DIRECTIVES

=================================================================

   

Valuables/Personal Effects:  Purse/Wallet; Cell Phone    (03/25/2017

   12:40:Grace Phelps RN)

Other Belongings:  laptop, clothing    (03/25/2017 12:40:Grace Phelps RN)

Disposition of Belongings:  Kept with Patient    (03/25/2017 12:40:Grace Phelps RN)

Advance Direct for Healthcare:  No, and Wants No Information   

   (03/25/2017 12:40:Grace Phelps RN)

Durable Power of :  No    (03/25/2017 12:40:Grace Phelps RN)

Living Will:  No    (03/25/2017 12:40:Grace hPelps RN)

Organ Donor:  Yes    (03/25/2017 12:40:Grace Phelps RN)

Pt Rights Information Given:  Yes    (03/25/2017 12:40:Grace Phelps RN)

Pt Understands Pt Rights:  Yes    (03/25/2017 12:40:Grace Phelps RN)

   

=================================================================

LEARNING ASSESSMENT

=================================================================

   

Knowledge Level:  Understands L_D Process; Understands Care Activities;

   Had Pre-Hospital Education; Understands Diagnosis    (03/25/2017

   12:40:Grace Phelps RN)

Barriers to Learning:  None    (03/25/2017 12:40:Grace Phelps RN)

Learning Readiness:  Motivated    (03/25/2017 12:40:Grace Phelps RN)

Learns Best By:  1 to 1 Instruction; Demonstration    (03/25/2017

   12:40:Grace Phelps RN)

Learning Needs:  Labor and Delivery Process; Pain Management; Symptoms

   to Report; Treatment Plan; Medication; Diagnosis; Nutrition;

   Equipment; Infant Care    (03/25/2017 12:40:Grace Phelps RN)

   

=================================================================

DOMESTIC VIOLANCE SCREENING

=================================================================

   

Dom Viol Threatened/Hurt:  No    (03/25/2017 12:40:Grace Phelps RN)

Hx of Abuse/Neglect past 2yrs:  No    (03/25/2017 12:40:Grace Phelps RN)

Feel Unsafe Going Home:  No    (03/25/2017 12:40:Grace Phelps RN)

Addt'l Observ Indicating Abuse:  No    (03/25/2017 12:40:Grace Phelps RN)

Reason Unable to Complete Screen:  N/A, Screen Completed    (03/25/2017

   12:40:Grace Phelps RN)

Considered Personal Harm/Suicide:  No    (03/25/2017 12:40:Grace Phelps RN)

   

=================================================================

NUTRITIONAL/FUNCTIONAL SCREENING

=================================================================

   

Problem with Appetite >5 Days:  No    (03/25/2017 12:40:Grace Phelps RN)

Chew/Swallow Difficulties:  No    (03/25/2017 12:40:Grace Phelps RN)

Inappropriate Wt Gain/Loss:  No    (03/25/2017 12:40:Grace Phelps RN)

Presence Skin Breakdown/Ulcer:  No    (03/25/2017 12:40:Grace Phelps RN)

Special Diet:  No    (03/25/2017 12:40:Grace Phelps RN)

Pt Requests Dietician Visit:  No    (03/25/2017 12:40:Grace Phelps RN)

Hx of Any of the Following?:  N/A    (03/25/2017 12:40:Grace Phelps RN)

New Diagnosis of:  N/A    (03/25/2017 12:40:Grace Phelps RN)

Requires Assist w/Ambulation:  No    (03/25/2017 12:40:Grace Phelps RN)

Uses Assist Device to Ambulate:  No    (03/25/2017 12:40:Grace Phelps RN)

Pt Requires Help w/ADL's:  No    (03/25/2017 12:40:Grace Phelps RN)

## 2017-03-27 NOTE — PDOC PROGRESS REPORT
Subjective-OB


Subjective: 


Post Delivery Day: 1








22 year old G1 now P1 s/p  pp day 1. Reports ambulating and voiding without 

difficulty. Reports Bowel movement this AM. Breastfeeding but also pumping due 

to low blood sugars on baby. Awaiting results from peds on baby's abdominal u/s 

for known abdominal mass. Denies any needs at this time 








Physical Exam (OB)


Vital Signs: 


 











Temp Pulse Resp BP Pulse Ox


 


 98.1 F   88   18   130/62 H  99 


 


 17 07:32  17 07:32  17 07:32  17 07:32  17 07:32








 Intake & Output











 17





 06:59 06:59 06:59


 


Weight 117.2 kg  














- General


General Appearance: Appears well


In distress: None





- Lochia


Lochia Amount: Scant < 10 ml


Lochia Color: Rubra/Red





- Abdomen


Description: Soft


Hernia Present: No


Fundal Description: Firm, Midline


Fundal Height: u/u - u/2





- Respiratory


Respiratory Status: No respiratory distress





- Neurological


Cognition: Normal


Orientation: AAOx4





- Psychological


Associated symptoms: Normal affect, Normal mood - bonding well with baby. 

Helpful FOB and family at bedside.





Objective-Diagnostic


Laboratory: 


 





 17 07:27 





 











  17





  07:27


 


WBC  13.3 H


 


RBC  3.93


 


Hgb  10.5 L


 


Hct  31.5 L


 


MCV  80


 


MCH  26.7 L


 


MCHC  33.3


 


RDW  14.4 H


 


Plt Count  147 L














Assessment and Plan(PN)





- Assessment and Plan


(1) Delivery normal


Is this a current diagnosis for this admission?: YesPlan: 


conitnue stay








(2) Anemia


Qualifiers: 


     Anemia type: unspecified type        Qualified Code(s): D64.9 - Anemia, 

unspecified  


Is this a current diagnosis for this admission?: YesPlan: 


iron supplementation











- Time Spent with Patient


Time with patient: 15-25 minutes


Medications reviewed and adjusted accordingly: Yes





- Disposition


Anticipated Discharge: Home


Within: within 24 hours

## 2017-03-28 VITALS — DIASTOLIC BLOOD PRESSURE: 75 MMHG | SYSTOLIC BLOOD PRESSURE: 98 MMHG

## 2017-03-28 RX ADMIN — IBUPROFEN SCH MG: 800 TABLET, FILM COATED ORAL at 06:14

## 2017-03-28 RX ADMIN — PRENATAL W/O A VIT W/ FE FUMARATE-FA CAP 106.5-1 MG SCH CAP: 106.5 CAPSULE ORAL at 09:42

## 2017-03-28 RX ADMIN — FERROUS SULFATE TAB 325 MG (65 MG ELEMENTAL FE) SCH MG: 325 (65 FE) TAB at 09:42

## 2017-03-28 RX ADMIN — DOCUSATE SODIUM SCH MG: 100 CAPSULE, LIQUID FILLED ORAL at 09:42

## 2017-03-28 RX ADMIN — SENNOSIDES, DOCUSATE SODIUM SCH EACH: 50; 8.6 TABLET, FILM COATED ORAL at 09:42

## 2017-03-28 RX ADMIN — IBUPROFEN SCH MG: 800 TABLET, FILM COATED ORAL at 13:31

## 2017-03-28 NOTE — PDOC PROGRESS REPORT
Subjective-OB


Subjective: 


Post Delivery Day:








22 year old.  Denies any needs at this time 


Doing well, hsb in room, holding baby, diet taken well, ambulating, lochia scant

, breastfeeding





Physical Exam (OB)


Vital Signs: 


 











Temp Pulse Resp BP Pulse Ox


 


 98.2 F   83   18   98/75 L  99 


 


 03/28/17 08:24  03/28/17 08:24  03/28/17 08:24  03/28/17 07:40  03/28/17 08:24














- Lochia


Lochia Amount: Scant < 10 ml


Lochia Color: Rubra/Red





- Abdomen


Description: Soft, Round


Hernia Present: No


Fundal Description: Firm, Midline


Fundal Height: u/u - u/2





Objective-Diagnostic


Laboratory: 


 





 03/27/17 07:27 











Assessment and Plan(PN)





- Assessment and Plan


(1) Anemia


Qualifiers: 


     Anemia type: unspecified type        Qualified Code(s): D64.9 - Anemia, 

unspecified  


Is this a current diagnosis for this admission?: Yes





(2) Delivery normal


Is this a current diagnosis for this admission?: Yes








- Time Spent with Patient


Time with patient: Less than 15 minutes


Medications reviewed and adjusted accordingly: Yes





- Disposition


Anticipated Discharge: Home


Within: Other - home today

## 2017-03-28 NOTE — PDOC DISCHARGE SUMMARY
Final Diagnosis


Discharge Date: 17





- Final Diagnosis


(1) Anemia


Is this a current diagnosis for this admission?: Yes





(2) Delivery normal


Is this a current diagnosis for this admission?: Yes








Discharge Data





- Discharge Medication


Home Medications: 








Pnv No.122/Iron/Folic Acid [Prenatal Multi Tablet] 1 each PO DAILY 02/15/17 








Gestational Age: 40.2


Reason(s) for Admission: PROM


Prenatal Procedures: NST, Ultrasound


Intrapartum Procedure(s): Spontaneous Vaginal Delivery


Postpartum Complication(s): Laceration-Perineal


Laceration-Degree: 1st





- [place] Data


  ** Baby 1 Male


Apgar at 1 minute: 8


Apgar at 5 minutes: 9


Weight: 4.139 kg


Home with Mother: Yes


Complications: No





- Diagnosis Test


Laboratory: 


 











Temp Pulse Resp BP Pulse Ox


 


 98.2 F   83   18   98/75 L  99 


 


 17 08:24  17 08:24  17 08:24  17 07:40  17 08:24








 











  17





  11:40 12:25 07:27


 


RBC   4.40  3.93


 


Hgb   11.5 L  10.5 L


 


Hct   34.8 L  31.5 L


 


Urine Opiates Screen  NEGATIVE  














- Discharge information/Instructions


Discharge Activity: Activity As Tolerated, No Lifting Over 10 Pounds, Pelvic 

Rest, No tub bath


Discharge Diet: As Tolerated, Regular


Disposition: HOME, SELF-CARE


Follow up with: [facility]


in: 4, Weeks

## 2020-03-21 ENCOUNTER — HOSPITAL ENCOUNTER (EMERGENCY)
Dept: HOSPITAL 62 - ER | Age: 25
LOS: 1 days | Discharge: LEFT BEFORE DISCHARGE | End: 2020-03-22
Payer: MEDICAID

## 2020-03-21 VITALS — SYSTOLIC BLOOD PRESSURE: 134 MMHG | DIASTOLIC BLOOD PRESSURE: 73 MMHG

## 2020-03-21 DIAGNOSIS — Z53.20: ICD-10-CM

## 2020-03-21 DIAGNOSIS — Z3A.11: ICD-10-CM

## 2020-03-21 DIAGNOSIS — R10.2: ICD-10-CM

## 2020-03-21 DIAGNOSIS — O26.891: Primary | ICD-10-CM

## 2020-03-21 DIAGNOSIS — O20.9: ICD-10-CM

## 2020-03-21 LAB
ADD MANUAL DIFF: NO
ALBUMIN SERPL-MCNC: 4 G/DL (ref 3.5–5)
ALP SERPL-CCNC: 43 U/L (ref 38–126)
ANION GAP SERPL CALC-SCNC: 12 MMOL/L (ref 5–19)
APPEARANCE UR: CLEAR
APTT PPP: (no result) S
AST SERPL-CCNC: 19 U/L (ref 14–36)
BASOPHILS # BLD AUTO: 0.1 10^3/UL (ref 0–0.2)
BASOPHILS NFR BLD AUTO: 1 % (ref 0–2)
BILIRUB DIRECT SERPL-MCNC: 0.2 MG/DL (ref 0–0.4)
BILIRUB SERPL-MCNC: 0.2 MG/DL (ref 0.2–1.3)
BILIRUB UR QL STRIP: NEGATIVE
BUN SERPL-MCNC: 8 MG/DL (ref 7–20)
CALCIUM: 9 MG/DL (ref 8.4–10.2)
CHLORIDE SERPL-SCNC: 104 MMOL/L (ref 98–107)
CO2 SERPL-SCNC: 21 MMOL/L (ref 22–30)
EOSINOPHIL # BLD AUTO: 0.1 10^3/UL (ref 0–0.6)
EOSINOPHIL NFR BLD AUTO: 1.6 % (ref 0–6)
ERYTHROCYTE [DISTWIDTH] IN BLOOD BY AUTOMATED COUNT: 14.1 % (ref 11.5–14)
GLUCOSE SERPL-MCNC: 94 MG/DL (ref 75–110)
GLUCOSE UR STRIP-MCNC: NEGATIVE MG/DL
HCT VFR BLD CALC: 36.9 % (ref 36–47)
HGB BLD-MCNC: 12.6 G/DL (ref 12–15.5)
KETONES UR STRIP-MCNC: NEGATIVE MG/DL
LYMPHOCYTES # BLD AUTO: 1.3 10^3/UL (ref 0.5–4.7)
LYMPHOCYTES NFR BLD AUTO: 16.7 % (ref 13–45)
MCH RBC QN AUTO: 26.7 PG (ref 27–33.4)
MCHC RBC AUTO-ENTMCNC: 34.1 G/DL (ref 32–36)
MCV RBC AUTO: 78 FL (ref 80–97)
MONOCYTES # BLD AUTO: 0.9 10^3/UL (ref 0.1–1.4)
MONOCYTES NFR BLD AUTO: 11.4 % (ref 3–13)
NEUTROPHILS # BLD AUTO: 5.2 10^3/UL (ref 1.7–8.2)
NEUTS SEG NFR BLD AUTO: 69.3 % (ref 42–78)
PH UR STRIP: 6 [PH] (ref 5–9)
PLATELET # BLD: 227 10^3/UL (ref 150–450)
POTASSIUM SERPL-SCNC: 3.8 MMOL/L (ref 3.6–5)
PROT SERPL-MCNC: 7.2 G/DL (ref 6.3–8.2)
PROT UR STRIP-MCNC: NEGATIVE MG/DL
RBC # BLD AUTO: 4.72 10^6/UL (ref 3.72–5.28)
RBCS (WET MOUNT): (no result)
SP GR UR STRIP: 1
T.VAGINALIS (WET MOUNT): (no result)
TOTAL CELLS COUNTED % (AUTO): 100 %
UROBILINOGEN UR-MCNC: NEGATIVE MG/DL (ref ?–2)
WBC # BLD AUTO: 7.5 10^3/UL (ref 4–10.5)
WBCS (WET MOUNT): (no result)
YEAST (WET MOUNT): (no result)

## 2020-03-21 PROCEDURE — 80053 COMPREHEN METABOLIC PANEL: CPT

## 2020-03-21 PROCEDURE — 87086 URINE CULTURE/COLONY COUNT: CPT

## 2020-03-21 PROCEDURE — 87591 N.GONORRHOEAE DNA AMP PROB: CPT

## 2020-03-21 PROCEDURE — 99284 EMERGENCY DEPT VISIT MOD MDM: CPT

## 2020-03-21 PROCEDURE — 36415 COLL VENOUS BLD VENIPUNCTURE: CPT

## 2020-03-21 PROCEDURE — 87210 SMEAR WET MOUNT SALINE/INK: CPT

## 2020-03-21 PROCEDURE — 76801 OB US < 14 WKS SINGLE FETUS: CPT

## 2020-03-21 PROCEDURE — 87491 CHLMYD TRACH DNA AMP PROBE: CPT

## 2020-03-21 PROCEDURE — 81001 URINALYSIS AUTO W/SCOPE: CPT

## 2020-03-21 PROCEDURE — 85025 COMPLETE CBC W/AUTO DIFF WBC: CPT

## 2020-03-21 PROCEDURE — 84702 CHORIONIC GONADOTROPIN TEST: CPT

## 2020-03-21 NOTE — RADIOLOGY REPORT (SQ)
EXAM: First trimester OB ultrasound



CLINICAL INDICATION: Vaginal bleeding. Pelvic pain. 9010 weeks

pregnant.



COMPARISON: None.



TECHNIQUE: First trimester OB ultrasound was performed.



FINDINGS:



Uterus: The uterus measures 11.0 x 7.6 x 9.0 cm. In the fundus is

a gestational sac with a fetal pole. The crown-rump length is

4.36 cm which correlates with a gestational age of 11 weeks one

day. The cervix is closed and measures 3.1 cm. Cardiac activity

is noted at 175 bpm. Early placental formation is seen

anteriorly.



Ovaries and adnexa: The right ovary measures 3.4 x 1.9 x 2.5 cm

and is morphologically normal with normal color and spectral

waveforms. The left ovary is not clearly seen. No free fluid. No

adnexal mass.



IMPRESSION:



Single living intrauterine pregnancy with estimated gestational

age of 11 weeks one day and estimated delivery date of 10/9/2020

## 2020-03-21 NOTE — ER DOCUMENT REPORT
ED GI/





- General


Chief Complaint: Pelvic Pain


Stated Complaint: LOWER ABDOMINAL PAIN 9 WKS PREG


Time Seen by Provider: 03/21/20 20:29


Primary Care Provider: 


[facility] [Provider Group] - 03/23/20


Mode of Arrival: Ambulatory


Notes: 





Patient is a G3, P2 about 9 to 10 weeks pregnant female who presents the 

emergency department with a chief complaint of lower abdominal pain, small 

amount of vaginal bleeding, and green discharge.  Patient states that she 

noticed some blood on the paper when she went to the bathroom earlier today.  

She noticed some green discharge and a "gush of fluid" when she went to the 

bathroom here in the emergency department earlier.  Patient has had com

plications with previous births.  States that she had premature birth at 34 

weeks.  Her other child ended up with heart complications.  States that she is 

only sexually active with one partner.


TRAVEL OUTSIDE OF THE [place] IN LAST 30 DAYS: No





- Related Data


Allergies/Adverse Reactions: 


                                        





No Known Allergies Allergy (Verified 03/23/17 08:24)


   











Past Medical History





- General


Information source: Patient





- Social History


Smoking Status: Never Smoker


Family History: Reviewed & Not Pertinent


Patient has suicidal ideation: No


Patient has homicidal ideation: No





Review of Systems





- Review of Systems


Notes: 





REVIEW OF SYSTEMS:





CONSTITUTIONAL :    Denies recent illness.  Denies recent unintentional weight 

loss.  Denies fever,  chills, or sweats. 


EENT: Denies eye, ear, throat, or mouth pain, discharge, or symptoms.  Denies 

nasal or sinus congestion.


CARDIOVASCULAR:  Denies chest pain.


RESPIRATORY: Denies shortness of breath, cough, congestion, difficulty 

breathing, or wheezing. 


GASTROINTESTINAL: Denies nausea, vomiting, and diarrhea.  Denies abdominal pain.

 Denies constipation.  


GENITOURINARY:  Denies difficulty urinating, burning, blood in urine, urgency or

frequency.


FEMALE  GENITOURINARY: See HPI.


MUSCULOSKELETAL:  Denies neck and back pain.  Denies joint pain or swelling.


SKIN:   Denies rash, itchiness, or lesions


HEMATOLOGIC :   Denies easy bruising or bleeding.


LYMPHATIC:  Denies swollen, painful, enlarged glands.


NEUROLOGICAL: Denies no numbness or tingling denies weakness.  Denies headache. 

Denies altered mental status.  Denies alteration in speech.


PSYCHIATRIC:  Denies stress, anxiety, alteration in sleep patterns, or 

depression.





All other systems reviewed and negative.





Physical Exam





- Vital signs


Vitals: 


                                        











Temp Pulse Resp BP Pulse Ox


 


 98.3 F   98   20   134/73 H  98 


 


 03/21/20 20:27  03/21/20 20:27 03/21/20 20:27 03/21/20 20:27 03/21/20 20:27














- Notes


Notes: 





PHYSICAL EXAMINATION:





GENERAL: Appears well, healthy, well-nourished, no acute distress. 





HEAD:  Normocephalic, atraumatic.





EYES:  PERRL, conjunctiva normal, all extraocular movements intact, sclera 

nonicteric





ENT:  Moist mucous membranes. 





NECK: Supple, no noticeable swelling, redness, rash.  Normal range of motion.





LUNGS: Equal breath sounds bilaterally and clear to auscultation.  No wheezes 

rales or rhonchi.





CARDIOVASCULAR: S1-S2, regular rate, regular rhythm.  Radial pulses 2+, normal.





ABDOMEN: Normoactive bowel sounds.  Soft, mildly tender mid lower abdomen,  no 

guarding, no rebound tenderness, and no masses palpated.





EXTREMITIES: Normal strength and range of motion, no pitting or edema.  No 

cyanosis. 





NEUROLOGICAL: Moves all extremities upon command.  Strength 5/5 in all 

extremities. 





PSYCH: Normal mood, normal affect.





SKIN: Warm, dry.  No rash, lesions, ulcerations noted.  Normal skin turgor.





GYN: 





Course





- Re-evaluation


Re-evalutation: 





03/22/20 00:12


I spoke to Dr. Corea, the OB/GYN on-call and he is advising that the patient get 

treated for gonorrhea and chlamydia and [facility].  Patient is refusing the Rocephin injection at this time. 

She states that she would like to wait, but she has agreed to take the 

azithromycin.  Follow-up precautions were given.  Verbal discharge instructions 

were given to the patient.  They verbalized understanding.  They are stable for 

discharge.





03/22/20 00:23


I was told by the primary nurse that the patient had left before receiving her 

azithromycin.  Discharge instructions were changed due to the patient leaving 

prior to receiving azithromycin.





- Vital Signs


Vital signs: 


                                        











Temp Pulse Resp BP Pulse Ox


 


 98.3 F   98   20   134/73 H  98 


 


 03/21/20 20:27  03/21/20 20:27 03/21/20 20:27 03/21/20 20:27 03/21/20 20:27














- Laboratory


Result Diagrams: 


                                 03/21/20 20:35





                                 03/21/20 20:35


Laboratory results interpreted by me: 


                                        











  03/21/20 03/21/20 03/21/20





  20:35 20:35 20:35


 


MCV  78 L  


 


MCH  26.7 L  


 


RDW  14.1 H  


 


Sodium   136.9 L 


 


Carbon Dioxide   21 L 


 


Creatinine   0.44 L 


 


Beta HCG, Quant   67302.00 H 


 


Leukocyte Esterase Rfl    TRACE H














Discharge





- Discharge


Clinical Impression: 


 Pelvic pain





Pregnancy


Qualifiers:


 Weeks of gestation: 11 weeks Qualified Code(s): Z3A.11 - 11 weeks gestation of 

pregnancy





Condition: Stable


Disposition: ELOPED


Additional Instructions: 


You are seen today in the emergency department for pelvic pain during pregnancy.

 You are 11 weeks pregnant.  Please follow-up with [facility]

on Monday in regards to this visit. 


Referrals: 


[facility] [Provider Group] - 03/23/20

## 2020-03-21 NOTE — ER DOCUMENT REPORT
ED Medical Screen (RME)





- General


Chief Complaint: Lower Abdominal Pain


Stated Complaint: LOWER ABDOMINAL PAIN 9 WKS PREG


Time Seen by Provider: 20 20:29


Primary Care Provider: 


LEROY OSORIO MD [Primary Care Provider] - Follow up as needed


Mode of Arrival: Ambulatory


Information source: Patient


Notes: 





25-year-old female presented to ED for complaint of vaginal bleeding and pelvic 

pain.  She states she is about 9 or 10 weeks pregnant.  She states that she is 

 3 para 2.  She states her blood type is a positive I did check the 

records and the blood screen did show a positive blood type.  Patient states 

that the pain started today and it is sharp every time she moves her legs to 

walk.  She is alert oriented respirations regular nonlabored.  She is walking 

into the emergency room herself.  She states she just noticed the bleeding on 

the toilet paper has not noticed any in her close.  She states she has not 

soaked a pad.














I have greeted and performed a rapid initial assessment of this patient.  A 

comprehensive ED assessment and evaluation of the patient, analysis of test 

results and completion of medical decision making process will be conducted by 

an additional ED providers.


TRAVEL OUTSIDE OF THE [place] IN LAST 30 DAYS: No





- Related Data


Allergies/Adverse Reactions: 


                                        





No Known Allergies Allergy (Verified 17 08:24)


   











Physical Exam





- Vital signs


Vitals: 





                                        











Temp Pulse Resp BP Pulse Ox


 


 98.3 F   98   20   134/73 H  98 


 


 20 20:27  20 20:27  20 20:27  20 20:27  20 20:27














Course





- Vital Signs


Vital signs: 





                                        











Temp Pulse Resp BP Pulse Ox


 


 98.3 F   98   20   134/73 H  98 


 


 20 20:27  20 20:27  20 20:27  20 20:27  20 20:27














Doctor's Discharge





- Discharge


Referrals: 


LEROY OSORIO MD [Primary Care Provider] - Follow up as needed

## 2020-03-22 LAB — CHLAM PCR: NOT DETECTED

## 2020-10-20 ENCOUNTER — HOSPITAL ENCOUNTER (OUTPATIENT)
Dept: HOSPITAL 62 - LC | Age: 25
Discharge: HOME | End: 2020-10-20
Attending: OBSTETRICS & GYNECOLOGY
Payer: MEDICAID

## 2020-10-20 DIAGNOSIS — O26.893: Primary | ICD-10-CM

## 2020-10-20 DIAGNOSIS — Z3A.40: ICD-10-CM

## 2020-10-20 DIAGNOSIS — R51.9: ICD-10-CM

## 2020-10-20 LAB
ADD MANUAL DIFF: NO
ALBUMIN SERPL-MCNC: 3.1 G/DL (ref 3.5–5)
ALP SERPL-CCNC: 119 U/L (ref 38–126)
ANION GAP SERPL CALC-SCNC: 8 MMOL/L (ref 5–19)
APPEARANCE UR: CLEAR
APTT PPP: (no result) S
AST SERPL-CCNC: 17 U/L (ref 14–36)
BARBITURATES UR QL SCN: NEGATIVE
BASOPHILS # BLD AUTO: 0.1 10^3/UL (ref 0–0.2)
BASOPHILS NFR BLD AUTO: 0.6 % (ref 0–2)
BILIRUB DIRECT SERPL-MCNC: 0.1 MG/DL (ref 0–0.4)
BILIRUB SERPL-MCNC: 0.3 MG/DL (ref 0.2–1.3)
BILIRUB UR QL STRIP: NEGATIVE
BUN SERPL-MCNC: 11 MG/DL (ref 7–20)
CALCIUM: 8.9 MG/DL (ref 8.4–10.2)
CHLORIDE SERPL-SCNC: 106 MMOL/L (ref 98–107)
CO2 SERPL-SCNC: 20 MMOL/L (ref 22–30)
CREAT UR-MCNC: 34.7 MG/DL (ref 16–327)
EOSINOPHIL # BLD AUTO: 0.1 10^3/UL (ref 0–0.6)
EOSINOPHIL NFR BLD AUTO: 0.7 % (ref 0–6)
ERYTHROCYTE [DISTWIDTH] IN BLOOD BY AUTOMATED COUNT: 14.2 % (ref 11.5–14)
GLUCOSE SERPL-MCNC: 82 MG/DL (ref 75–110)
GLUCOSE UR STRIP-MCNC: NEGATIVE MG/DL
HCT VFR BLD CALC: 34.1 % (ref 36–47)
HGB BLD-MCNC: 11.7 G/DL (ref 12–15.5)
KETONES UR STRIP-MCNC: NEGATIVE MG/DL
LYMPHOCYTES # BLD AUTO: 1.1 10^3/UL (ref 0.5–4.7)
LYMPHOCYTES NFR BLD AUTO: 11.4 % (ref 13–45)
MCH RBC QN AUTO: 26.7 PG (ref 27–33.4)
MCHC RBC AUTO-ENTMCNC: 34.3 G/DL (ref 32–36)
MCV RBC AUTO: 78 FL (ref 80–97)
METHADONE UR QL SCN: NEGATIVE
MONOCYTES # BLD AUTO: 0.6 10^3/UL (ref 0.1–1.4)
MONOCYTES NFR BLD AUTO: 6 % (ref 3–13)
NEUTROPHILS # BLD AUTO: 7.8 10^3/UL (ref 1.7–8.2)
NEUTS SEG NFR BLD AUTO: 81.3 % (ref 42–78)
NITRITE UR QL STRIP: NEGATIVE
PCP UR QL SCN: NEGATIVE
PH UR STRIP: 6 [PH] (ref 5–9)
PLATELET # BLD: 159 10^3/UL (ref 150–450)
POTASSIUM SERPL-SCNC: 3.9 MMOL/L (ref 3.6–5)
PROT SERPL-MCNC: 5.8 G/DL (ref 6.3–8.2)
PROT UR STRIP-MCNC: 14.6 MG/DL (ref ?–12)
PROT UR STRIP-MCNC: NEGATIVE MG/DL
RBC # BLD AUTO: 4.37 10^6/UL (ref 3.72–5.28)
SP GR UR STRIP: 1.01
TOTAL CELLS COUNTED % (AUTO): 100 %
UR PRO/CREAT RATIO RESULT: 0.4 MG/MG (ref 0–0.2)
URATE SERPL-MCNC: 5.2 MG/DL (ref 2.5–6.2)
URINE AMPHETAMINES SCREEN: NEGATIVE
URINE BENZODIAZEPINES SCREEN: NEGATIVE
URINE COCAINE SCREEN: NEGATIVE
URINE MARIJUANA (THC) SCREEN: NEGATIVE
UROBILINOGEN UR-MCNC: NEGATIVE MG/DL (ref ?–2)
WBC # BLD AUTO: 9.6 10^3/UL (ref 4–10.5)

## 2020-10-20 PROCEDURE — 85025 COMPLETE CBC W/AUTO DIFF WBC: CPT

## 2020-10-20 PROCEDURE — 81001 URINALYSIS AUTO W/SCOPE: CPT

## 2020-10-20 PROCEDURE — 84156 ASSAY OF PROTEIN URINE: CPT

## 2020-10-20 PROCEDURE — 80307 DRUG TEST PRSMV CHEM ANLYZR: CPT

## 2020-10-20 PROCEDURE — 36415 COLL VENOUS BLD VENIPUNCTURE: CPT

## 2020-10-20 PROCEDURE — 59025 FETAL NON-STRESS TEST: CPT

## 2020-10-20 PROCEDURE — 80053 COMPREHEN METABOLIC PANEL: CPT

## 2020-10-20 PROCEDURE — 84550 ASSAY OF BLOOD/URIC ACID: CPT

## 2020-10-20 PROCEDURE — 82570 ASSAY OF URINE CREATININE: CPT

## 2020-10-20 PROCEDURE — 83615 LACTATE (LD) (LDH) ENZYME: CPT

## 2020-10-20 NOTE — NON STRESS TEST REPORT
=================================================================

Non Stress Test

=================================================================

Datetime Report Generated by CPN: 10/20/2020 13:40

   

   

=================================================================

DEMOGRAPHIC

=================================================================

   

Test Number:  1

EGA NST:  40.0

   

=================================================================

INDICATION

=================================================================

   

Indication for Study (NST) Other:  PIH workup

   

=================================================================

VITAL SIGNS

=================================================================

   

Temperature - NST:  98.4

Pulse - NST:  81

RESP - NST:  18

NBPSYS NST:  119

NBPDIA NST:  75

   

=================================================================

MONITORING

=================================================================

   

Monitor Explained:  Monitor Explained; Test Explained; Patient

   Verbalized Understanding

Time on Monitor:  10/20/2020 11:40

Time off Monitor:  10/20/2020 12:58

NST Duration:  78

   

=================================================================

NST INTERVENTIONS

=================================================================

   

NST Interventions:  PO Hydration

Physician Notified NST:  J. Decker, CNM

BABY A:  [id number]

   

=================================================================

BABY A

=================================================================

   

Fetal Movement :  Present

Contraction Frequency :  irregular

FHR Baseline :  125

Accelerations :  15X15

Decelerations :  None

Variability :  Moderate 6-25bpm

NST Review:  Meets Criteria for Reactive NST

NST Review and Verified By :  ClariceRNC

NST Results:  Reactive

   

=================================================================

NST REPORT

=================================================================

   

Report Trigger:  Send Report

## 2020-10-21 ENCOUNTER — HOSPITAL ENCOUNTER (INPATIENT)
Dept: HOSPITAL 62 - LR | Age: 25
LOS: 2 days | Discharge: HOME | End: 2020-10-23
Attending: OBSTETRICS & GYNECOLOGY | Admitting: OBSTETRICS & GYNECOLOGY
Payer: MEDICAID

## 2020-10-21 DIAGNOSIS — Z3A.41: ICD-10-CM

## 2020-10-21 DIAGNOSIS — D50.9: ICD-10-CM

## 2020-10-21 DIAGNOSIS — Z87.891: ICD-10-CM

## 2020-10-21 DIAGNOSIS — O26.03: ICD-10-CM

## 2020-10-21 DIAGNOSIS — O48.0: Primary | ICD-10-CM

## 2020-10-21 LAB
ADD MANUAL DIFF: NO
ALBUMIN SERPL-MCNC: 3.1 G/DL (ref 3.5–5)
ALP SERPL-CCNC: 115 U/L (ref 38–126)
ANION GAP SERPL CALC-SCNC: 6 MMOL/L (ref 5–19)
APPEARANCE UR: CLEAR
APTT PPP: YELLOW S
AST SERPL-CCNC: 16 U/L (ref 14–36)
BARBITURATES UR QL SCN: (no result)
BASOPHILS # BLD AUTO: 0 10^3/UL (ref 0–0.2)
BASOPHILS NFR BLD AUTO: 0.3 % (ref 0–2)
BILIRUB DIRECT SERPL-MCNC: 0.2 MG/DL (ref 0–0.4)
BILIRUB SERPL-MCNC: 0.2 MG/DL (ref 0.2–1.3)
BILIRUB UR QL STRIP: NEGATIVE
BUN SERPL-MCNC: 12 MG/DL (ref 7–20)
CALCIUM: 9.1 MG/DL (ref 8.4–10.2)
CHLORIDE SERPL-SCNC: 107 MMOL/L (ref 98–107)
CO2 SERPL-SCNC: 21 MMOL/L (ref 22–30)
EOSINOPHIL # BLD AUTO: 0.1 10^3/UL (ref 0–0.6)
EOSINOPHIL NFR BLD AUTO: 0.6 % (ref 0–6)
ERYTHROCYTE [DISTWIDTH] IN BLOOD BY AUTOMATED COUNT: 14.7 % (ref 11.5–14)
GLUCOSE SERPL-MCNC: 74 MG/DL (ref 75–110)
GLUCOSE UR STRIP-MCNC: NEGATIVE MG/DL
HCT VFR BLD CALC: 32.7 % (ref 36–47)
HGB BLD-MCNC: 11 G/DL (ref 12–15.5)
KETONES UR STRIP-MCNC: NEGATIVE MG/DL
LYMPHOCYTES # BLD AUTO: 1.2 10^3/UL (ref 0.5–4.7)
LYMPHOCYTES NFR BLD AUTO: 12.9 % (ref 13–45)
MCH RBC QN AUTO: 26.4 PG (ref 27–33.4)
MCHC RBC AUTO-ENTMCNC: 33.5 G/DL (ref 32–36)
MCV RBC AUTO: 79 FL (ref 80–97)
METHADONE UR QL SCN: NEGATIVE
MONOCYTES # BLD AUTO: 0.7 10^3/UL (ref 0.1–1.4)
MONOCYTES NFR BLD AUTO: 7.8 % (ref 3–13)
NEUTROPHILS # BLD AUTO: 7.1 10^3/UL (ref 1.7–8.2)
NEUTS SEG NFR BLD AUTO: 78.4 % (ref 42–78)
NITRITE UR QL STRIP: NEGATIVE
PCP UR QL SCN: NEGATIVE
PH UR STRIP: 6 [PH] (ref 5–9)
PLATELET # BLD: 156 10^3/UL (ref 150–450)
POTASSIUM SERPL-SCNC: 4.1 MMOL/L (ref 3.6–5)
PROT SERPL-MCNC: 5.7 G/DL (ref 6.3–8.2)
PROT UR STRIP-MCNC: NEGATIVE MG/DL
RBC # BLD AUTO: 4.16 10^6/UL (ref 3.72–5.28)
SP GR UR STRIP: 1.01
TOTAL CELLS COUNTED % (AUTO): 100 %
URATE SERPL-MCNC: 4.7 MG/DL (ref 2.5–6.2)
URINE AMPHETAMINES SCREEN: NEGATIVE
URINE BENZODIAZEPINES SCREEN: NEGATIVE
URINE COCAINE SCREEN: NEGATIVE
URINE MARIJUANA (THC) SCREEN: NEGATIVE
UROBILINOGEN UR-MCNC: NEGATIVE MG/DL (ref ?–2)
WBC # BLD AUTO: 9.1 10^3/UL (ref 4–10.5)

## 2020-10-21 PROCEDURE — 86592 SYPHILIS TEST NON-TREP QUAL: CPT

## 2020-10-21 PROCEDURE — 83615 LACTATE (LD) (LDH) ENZYME: CPT

## 2020-10-21 PROCEDURE — 86900 BLOOD TYPING SEROLOGIC ABO: CPT

## 2020-10-21 PROCEDURE — 86850 RBC ANTIBODY SCREEN: CPT

## 2020-10-21 PROCEDURE — 81001 URINALYSIS AUTO W/SCOPE: CPT

## 2020-10-21 PROCEDURE — 80307 DRUG TEST PRSMV CHEM ANLYZR: CPT

## 2020-10-21 PROCEDURE — 36415 COLL VENOUS BLD VENIPUNCTURE: CPT

## 2020-10-21 PROCEDURE — 84112 EVAL AMNIOTIC FLUID PROTEIN: CPT

## 2020-10-21 PROCEDURE — 84550 ASSAY OF BLOOD/URIC ACID: CPT

## 2020-10-21 PROCEDURE — 86901 BLOOD TYPING SEROLOGIC RH(D): CPT

## 2020-10-21 PROCEDURE — 80053 COMPREHEN METABOLIC PANEL: CPT

## 2020-10-21 PROCEDURE — 85025 COMPLETE CBC W/AUTO DIFF WBC: CPT

## 2020-10-21 NOTE — ADMISSION PHYSICAL
=================================================================



=================================================================

Datetime Report Generated by CPN: 10/21/2020 17:04

   

   

=================================================================

CURRENT ADMISSION

=================================================================

   

Prenatal Hx Assessment:  The Prenatal History has been Reviewed and is

   Current

Chief Complaint:  Scheduled Induction of Labor

Indication for Induction:  Postterm

Admit Impression :  Postterm, Intrauterine Pregnancy

Admit Plan:  Admit to Unit; Initiate Labor Induction Protocol

   

=================================================================

ALLERGIES

=================================================================

   

Medication Allergies:  No

Medication Allergies:  No Known Allergies (2017)

Latex:  No Latex Allergies

   

=================================================================

OBSTETRICAL HISTORY

=================================================================

   

EDC:  10/14/2020 00:00

:  3

Para:  2

Term:  1

:  1

SAB:  0

IAB:  0

Ectopic:  0

Livin

Cesareans:  0

VBACs:  0

Multiple Births:  0

Gestational Diabetes:  No

Rh Sensitization:  No

Incompetent Cervix:  No

MERI:  No

Infertility:  No

ART Treatment:  No

Uterine Anomaly:  No

IUGR:  No

Hx Previous C/S:  No

Macrosomia:  Yes

Hx Loss/Stillborn:  No

PIH:  Yes

Hx  Death:  No

Placenta Previa/Abruption:  No

Depression/PP Depression:  Yes

PTL/PROM:  Yes

Post Partum Hemorrhage:  No

Current Pregnancy Procedures:  Ultrasound; NST

Obstetrical History Comments:  g-1 Male term 9+2 

   g-2 Male 34 weeks PROM

   g-3 current pregnancy

   

=================================================================

***SEE PRENATAL RECORDS***

=================================================================

   

Alcohol:  No

Marijuana :  No

Cocaine:  No

Other Illicit Drugs:  No

Cigarettes:  Current Some Day Smoker. [id number]

Cigarette Frequency:  < 5 per day

Advised to Stop:  No

Cigarette Comments:  stoppedsm

   

=================================================================

MEDICAL HISTORY

=================================================================

   

Diabetes:  No

Blood Transfusion:  No

Pulmonary Disease (Asthma, TB):  No

Breast Disease:  No

Hypertension:  No

Gyn Surgery:  No

Heart Disease:  No

Hosp/Surgery:  No

Autoimmune Disorder:  No

Anesthetic Complications:  No

Kidney Disease:  No

Abnormal Pap Smear:  No

Neuro/Epilepsy:  No

Psychiatric Disorders:  No

Other Medical Diseases:  No

Hepatitis/Liver Disease:  No

Significant Family History:  No

Varicosities/Phlebitis:  No

Trauma/Violence :  No

Thyroid Dysfunction:  No

   

=================================================================

INFECTIOUS HISTORY

=================================================================

   

Gonorrhea:  No

Genital Herpes:  No

Chlamydia:  No

Tuberculosis:  No

Syphilis:  No

Hepatitis:  No

HIV/AIDS Exposure:  No

Rash or Viral Illness:  No

HPV:  No

   

=================================================================

PHYSICAL EXAM

=================================================================

   

General:  Normal

Heart:  Normal

Lungs:  Normal

Abdomen:  Normal

Genitourinary Exam:  Normal

Extremities:  Normal

Pelvic Type:  Adequate

Physical Exam Comments:  proven to 9lbs 2 oz

Vital Signs:  Reviewed

   

=================================================================

MEMBRANES

=================================================================

   

Membranes:  Intact

   

=================================================================

FETUS A

=================================================================

   

EGA:  41.0

Monitoring:  External US

Decelerations:  None

FHR Category:  Category I

Admit Comment:  24yo  @ 41wga into L_D for IOL secondary to post

   dates pregnancy. Pt is A pos, Rubella immune, varicella non-immune.

   GBS negative with pregnancy complicated by excessive weight gain

   (>40lbs), obesity, and anemia. Pt also with elevated 1hr at 28w but

   normal 3hr.  Plan is IOL with pitocin at this time, epidural prn.

   Dr. Aguilar is the OB on call and aware of admission and pt status. 

   

=================================================================

PLANS FOR LABOR AND DELIVERY

=================================================================

   

Labor and Delivery:  None

Pain Management:  Epidural

Feeding Preference:  Breast

Benefit of Breast Feed Discussed:  Yes

Circumcision:  N/A

   

=================================================================

INFORMED CONSENT

=================================================================

   

Assignment:  Tiera Aguilar MD

Signature:  Electronically signed by Vidhya Montano CNM on

   10/21/2020 at 17:03  with User ID: Becki

:  Electronically signed by Vidhya Montano CNM on 10/21/2020 at

   17:03  with User ID: Becki

## 2020-10-22 RX ADMIN — FERROUS SULFATE TAB 325 MG (65 MG ELEMENTAL FE) SCH MG: 325 (65 FE) TAB at 18:37

## 2020-10-22 RX ADMIN — ACETAMINOPHEN PRN MG: 325 TABLET ORAL at 18:36

## 2020-10-22 RX ADMIN — IBUPROFEN SCH MG: 800 TABLET, FILM COATED ORAL at 21:06

## 2020-10-22 RX ADMIN — IBUPROFEN SCH MG: 800 TABLET, FILM COATED ORAL at 13:39

## 2020-10-22 RX ADMIN — DOCUSATE SODIUM SCH MG: 100 CAPSULE, LIQUID FILLED ORAL at 18:37

## 2020-10-22 RX ADMIN — IBUPROFEN SCH MG: 800 TABLET, FILM COATED ORAL at 00:35

## 2020-10-22 RX ADMIN — IBUPROFEN SCH MG: 800 TABLET, FILM COATED ORAL at 05:25

## 2020-10-22 RX ADMIN — SENNOSIDES, DOCUSATE SODIUM SCH EACH: 50; 8.6 TABLET, FILM COATED ORAL at 09:50

## 2020-10-22 RX ADMIN — FAMOTIDINE SCH MG: 20 TABLET, FILM COATED ORAL at 09:50

## 2020-10-22 RX ADMIN — DOCUSATE SODIUM SCH MG: 100 CAPSULE, LIQUID FILLED ORAL at 09:50

## 2020-10-22 RX ADMIN — FAMOTIDINE SCH MG: 20 TABLET, FILM COATED ORAL at 21:07

## 2020-10-22 RX ADMIN — FERROUS SULFATE TAB 325 MG (65 MG ELEMENTAL FE) SCH MG: 325 (65 FE) TAB at 09:50

## 2020-10-22 RX ADMIN — Medication SCH CAP: at 09:50

## 2020-10-22 NOTE — BIRTH CERTIFICATE DATA
=================================================================

Birth Cert Data

=================================================================

Datetime Report Generated by CPLAN: 10/22/2020 01:21

   

   

=================================================================

BIRTH CERTIFICATE DATA

=================================================================

   

 47a. Prenatal Care:  Yes    (10/20/2020 11:57:Bee Mixon RN)

 47b. Date of First Visit:  2020 00:00    (10/20/2020 11:57:ERLINDA Pagan)

 47c. Date of Last Visit:  10/16/2020 00:00    (10/20/2020 11:57:ERLINDA Pagan)

 47d. Number of Prenatal Visits:  14    (10/20/2020 11:57:ERLINDA Pagan)

 48a. Number of Prev Live Births:  2    (10/20/2020 11:57:Bee Mixon RN)

 48b. Now Livin    (10/20/2020 11:57:Bee Mixon RN)

 48c. Live Births Now Dead:  0    (10/20/2020 11:57:QS system process)

 48d. Date of Last Live Birth:  2018 00:00    (10/20/2020

   11:57:Bee Mixon RN)

 48e. Pregnancy Losses:  0    (10/20/2020 11:57:Bee Mixon RN)

   

=================================================================

RISK FACTORS IN THIS PREGNANCY

=================================================================

   

 49a. Diabetes:  No    (10/20/2020 11:57:Bhumi Bobby RN)

 49b. Hypertension:  Yes    (10/20/2020 11:57:ERLINDA Pagan)

 Type of Hypertension:  Gestational (PIH, Pre-eclampsia)    (10/20/2020

   11:57:ERLINDA Pagan)

 49c. Previous  Births:  1    (10/20/2020 11:57:Bee Mixon RN)

 49d. Stillborns:  No    (10/20/2020 11:57:Bhumi Bobby RN)

 49d. IUGR:  No    (10/20/2020 11:57:Bhumi Bobby RN)

 49e. Infertility Treatment:  No    (10/20/2020 11:57:Bhumi Bobby RN)

 49f. Previous Cesareans:  0    (10/20/2020 11:57:Bee Mixon RN)

   

=================================================================

Mother's Height

=================================================================

   

 50b. Height Inches:  66    (10/21/2020 18:26:QS system process)

   

=================================================================

Mother's Weight 

=================================================================

   

 51a. Pre-Pregnancy Weight (lbs):  230    (10/20/2020 11:57:ERLINDA Pagan)

 51b. Weight at Delivery (lbs):  277    (10/21/2020 18:26:QS system

   process)

   

=================================================================

Infections Present/Treated

=================================================================

   

 53a. Gonorrhea:  No    (10/20/2020 11:57:Bhumi Bobby RN)

 Results this Hospital Visit :  Negative    (10/20/2020 11:57:ERLINDA Pagan)

 53b. Syphilis:  No    (10/20/2020 11:57:Bhumi Bobby RN)

 53c. Chlamydia:  No    (10/20/2020 11:57:Bhumi Bobby RN)

 Results this Hospital Visit:  Negative    (10/20/2020 11:57:ERLINDA Pagan)

 53d. Hepatitis B:  No    (10/20/2020 11:57:Bhumi Bobby RN)

 Results this Hospital Visit:  Negative    (10/20/2020 11:57:Bhumi Bobby RN)

 53e. Hepatitis C:  Negative    (10/20/2020 11:57:ERLINDA Pagan)

 53h. Mother Tested for HBsAG:  Yes    (10/20/2020 11:57:ERLINDA Pagan)

 53i. Date Tested:  2020 00:00    (10/20/2020 11:57:ERLINDA Pagan)

 53j. Test Result:  Negative    (10/20/2020 11:57:Bhumi Bobby RN)

   

=================================================================

Obstetric Procedures 

=================================================================

   

 54a, b, c. Obstetric Procedures:  Ultrasound; NST    (10/20/2020

   11:57:Bhumi Bobby RN)

   

=================================================================

Cigarette Smoking 

=================================================================

   

 Cigarette Smoking:  Current Some Day Smoker. [id number]   

   (10/20/2020 11:57:Bhumi Bobby RN)

 55a. 3 Months Before Preg - Cig:  10    (10/20/2020 11:57:Bhumi Bobby RN)

 55b. 1st Trimester of Preg- Cig:  10    (10/20/2020 11:57:Bhumi Bobby RN)

 55c. 2nd Trimester of Preg- Ci    (10/20/2020 11:57:Bee Mixon RN)

 55d. 3rd Trimester of Preg- Ci    (10/20/2020 11:57:Bee Mixon RN)

   

=================================================================

Onset of Labor

=================================================================

   

 56a. PROM >12 Hrs:  1.07    (10/20/2020 11:57:QS system process)

 56b. Precipitous Labor <3 Hrs:  1    (10/20/2020 11:57:QS system

   process)

 56c. Prolonged Labor > 20 Hrs:  1    (10/20/2020 11:57:QS system

   process)

   

=================================================================



=================================================================

   

 57a. Induction of Labor:  Induction    (10/20/2020 11:57:Bhumi Bobby RN)

 57c. Non-Vertex Presentation A:  Vertex    (10/20/2020 11:57:Taina Clemons RN)

 57d. Steroids - Fetal Lung Mat:  None    (10/20/2020 11:57:Bhumi Bobby RN)

 57d. Steroids - Fetal Lung Mat:  Not Applicable    (10/20/2020

   11:57:Bhumi Bobby RN)

 57f. Mat Chorio or Temp >100.4:  98.4    (10/20/2020 11:57:Taina Clemons RN)

 57g. Moderate/Heavy Meconium:  Clear    (10/21/2020 22:58:Delisa Galan RN)

 57h. Fetal Intolerance of Labor:  N/A    (10/20/2020 11:57:Taina Clemons RN)

 57i. Epidural/Spinal Anesthesia:  Intrathecal    (10/20/2020

   11:57:Tiera Aguilar MD)

   

=================================================================

Method of Delivery

=================================================================

   

 58a. Forceps - Unsuccessful A:  N/A    (10/20/2020 11:57:Taina Clemons RN)

 58b. Vacuum - Unsuccessful A:  N/A    (10/20/2020 11:57:Taina Clemons RN)

   

=================================================================

58c. Presentation at Birth

=================================================================

   

 58c. Presentation at Birth - A :  Vertex    (10/20/2020 11:57:Taina Clemons RN)

 58c. Presentation at Birth - A :  N/A    (10/20/2020 11:57:Taina Clemons RN)

 58c. Presentation at Birth - A :  Cephalic    (10/21/2020 16:34:ERLINDA Pagan)

   

=================================================================

Final Route and Method of Del 

=================================================================

   

 58d. Baby A Route/Delivery:  Vaginal    (10/22/2020 00:02:Delisa Galan RN)

 58e. Trial of Labor Attempted:  No    (10/20/2020 11:57:Taina Clemons RN)

 58e. Trial of Labor Attempted A:  N/A    (10/20/2020 11:57:Taina Clemons RN)

 58e. Trial of Labor Attempted B:  N/A    (10/20/2020 11:57:Taina Clemons RN)

   

=================================================================

Maternal Morbidity

=================================================================

   

 59b. 3rd or 4th Degree Lacs:  Left labial laceration superficial and

   hemastatic    (10/20/2020 11:57:Tiera Aguilar, MD)

   

=================================================================



=================================================================

   

 Birthweight Baby A:  3710    (10/20/2020 11:57:Delisa Galan RN)

 60a. Pounds :  8    (10/20/2020 11:57:QS system process)

 60b. Ounces:  3    (10/20/2020 11:57:QS system process)

   

=================================================================

61. GA at Delivery 

=================================================================

   

 Baby A:  41.0    (10/20/2020 11:57:Taina Clemons RN)

:  Late Term-  41- 41.6 Weeks    (10/20/2020 11:57:QS system process)

   

=================================================================

62a. APGAR 5 Minute

=================================================================

   

 Baby A:  9    (10/20/2020 11:57:QS system process)

## 2020-10-22 NOTE — DELIVERY SUMMARY
=================================================================

Del Sum A-C

=================================================================

Datetime Report Generated by CPN: 10/22/2020 01:20

   

   

=================================================================

DELIVERY PERSONNEL

=================================================================

   

DELIVERY PERSONNEL:  [id number]

Delivery Doctor::  Tiera Aguilar MD

Labor and Delivery Nurse::  Delisa Galan RN

Nursery Nurse::  Cheryle Fan RN 

Scrub Tech/CNA:  Lina Sanders, ST

Scrub Tech/CNA:  Mary Martins, CST

   

=================================================================

MATERNAL INFORMATION

=================================================================

   

Delivery Anesthesia:  Epidural

Medications After Delivery:  Pitocin 30 Units in 500ml NS/D5W

Maternal Complications:  None

Provider Comments:  Called to patients room, complete and +2 station

   with urge to push. Patient pushed through three contractions and

   delivered a viable female infant over an intact perineum. After

   delivery of the head, the left hand then arm delivered. THis was the

   anterior arm. The rest of the body quickly followed. Infant vigorous

   at delivery. COrd clamping delayed 30 seconds. INfant placed skin to

   skin with mother. Both stable. 

   Fundus firm

   

=================================================================

LABOR SUMMARY

=================================================================

   

EDC:  10/14/2020 00:00

No. Babies in Womb:  1

 Attempted:  No

Labor Anesthesia:  Intrathecal

   

=================================================================

LABOR INFORMATION

=================================================================

   

Reason for Induction:  Post Dates

Onset of Labor:  10/21/2020 22:58

Complete Dilatation:  10/21/2020 23:46

Oxytocin:  Induction

Group B Beta Strep:  negative

Antibiotics # of Doses:  0

Name of Antibiotic Given:  n/a

Steroids Given:  None

Reason Steroids Not Administered:  Not Applicable

   

=================================================================

MEMBRANES

=================================================================

   

Membranes Rupture Method:  Artificial

Rupture of Membranes:  10/21/2020 22:58

Length of Rupture (hr):  1.07

Amniotic Fluid Color:  Clear

Amniotic Fluid Amount:  Small

Amniotic Fluid Odor:  Normal

   

=================================================================

STAGES OF LABOR

=================================================================

   

Stage 1 hr:  0

Stage 1 min:  48

Stage 2 hr:  0

Stage 2 min:  16

Stage 3 hr:  0

Stage 3 min:  5

Total Time in Labor hr:  1

Total Time in Labor min:  9

   

=================================================================

VAGINAL DELIVERY

=================================================================

   

Episiotomy:  None

Other Laceration:  Left labial laceration superficial and hemastatic

Laceration Repair:  Not Applicable

Sponge Count Correct:  Yes

Sharps Count Correct:  Yes

   

=================================================================

CSECTION DELIVERY

=================================================================

   

Primary Indication:  N/A

CSection Incision:  N/A

   

=================================================================

BABY A INFORMATION

=================================================================

   

Infant Delivery Date/Time:  10/22/2020 00:02

Method of Delivery:  Vaginal

Nurse Controlled Delivery:  No

Born in Route :  No

:  N/A

Forceps:  N/A

Vacuum Extraction:  N/A

Shoulder Dystocia :  No

   

=================================================================

PRESENTATION/POSITION BABY A

=================================================================

   

Presentation:  Cephalic

Cephalic Presentation:  Vertex

Vertex Position:  Right Occipital Anterior

Breech Presentation:  N/A

   

=================================================================

PLACENTA INFORMATION BABY A

=================================================================

   

Placenta Delivery Time :  10/22/2020 00:07

Placenta Method of Delivery:  Spontaneous

Placenta Status:  Delivered

   

=================================================================

APGAR SCORES BABY A

=================================================================

   

Heart Rate 1 min:  >100 bpm

Resp Effort 1 min:  Good Cry

Reflex Irritability 1 min:  Cough or Sneeze or Pulls Away

Muscle Tone 1 min:  Active Motion

Color 1 min:  Blue/Pale

Resuscitation Effort 1 min:  Tactile Stimulation

APGAR SCORE 1 MIN:  8

Heart Rate 5 min:  >100 bpm

Resp Effort 5 min:  Good Cry

Reflex Irritability 5 min:  Cough or Sneeze or Pulls Away

Muscle Tone 5 min:  Active Motion

Color 5 min:  Body Pink, Extremities Blue

Resuscitation Effort 5 min:  Tactile Stimulation

APGAR SCORE 5 MIN:  9

   

=================================================================

INFANT INFORMATION BABY A

=================================================================

   

Gestational Age at Delivery:  41.0

Gestational Status:  Late Term-  41- 41.6 Weeks

Infant Outcome :  Liveborn

Infant Condition :  Stable

Infant Sex:  Female

   

=================================================================

IDENTIFICATION BABY A

=================================================================

   

Infant Verification Date/Time:  10/22/2020 00:10

ID Band Number:  [id number]

Mother's Name Verified:  Yes

Infant Medical Record Number:  [id number]

RN Verifying Infant:  ELIEZER Fan, RN/ K. Deonte, RN

   

=================================================================

WEIGHT/LENGTH BABY A

=================================================================

   

Infant Birthweight (gm):  3710

Infant Weight (lb):  8

Infant Weight (oz):  3

Infant Length (in):  19.75

Infant Length (cm):  50.17

   

=================================================================

CORD INFORMATION BABY A

=================================================================

   

No. Cord Vessels:  3

Nuchal Cord :  N/A

Cord Blood Taken:  Yes-For Storage (Mom's Blood type +)

Infant Suction:  Mouth

   

=================================================================

ASSESSMENT BABY A

=================================================================

   

Infant Complications:  None

Physical Findings at Delivery:  Within Normal Limits

Infant Respirations:  Appears Normal

Skin to Skin:  Yes

Neonatologist/ALS Called :  No

Transferred To:  Remains with Mother

   

=================================================================

BABY B INFORMATION

=================================================================

   

 :  N/A

   

=================================================================

ASSESSMENT BABY B

=================================================================

   

Skin to Skin:  No

   

=================================================================

SIGNATURES

=================================================================

   

Signature:  Electronically signed by Tiera Aguilar MD on 10/22/2020 at

   00:22  with User ID: [id number]

:  Electronically signed by Tiera Aguilar MD on 10/22/2020 at 00:22 

   with User ID: [id number]

## 2020-10-23 VITALS — SYSTOLIC BLOOD PRESSURE: 120 MMHG | DIASTOLIC BLOOD PRESSURE: 82 MMHG

## 2020-10-23 RX ADMIN — FAMOTIDINE SCH MG: 20 TABLET, FILM COATED ORAL at 09:23

## 2020-10-23 RX ADMIN — DOCUSATE SODIUM SCH MG: 100 CAPSULE, LIQUID FILLED ORAL at 09:23

## 2020-10-23 RX ADMIN — FERROUS SULFATE TAB 325 MG (65 MG ELEMENTAL FE) SCH MG: 325 (65 FE) TAB at 09:23

## 2020-10-23 RX ADMIN — IBUPROFEN SCH MG: 800 TABLET, FILM COATED ORAL at 06:36

## 2020-10-23 RX ADMIN — IBUPROFEN SCH: 800 TABLET, FILM COATED ORAL at 14:16

## 2020-10-23 RX ADMIN — SENNOSIDES, DOCUSATE SODIUM SCH EACH: 50; 8.6 TABLET, FILM COATED ORAL at 09:23

## 2020-10-23 RX ADMIN — Medication SCH CAP: at 09:23

## 2020-10-23 RX ADMIN — ACETAMINOPHEN PRN MG: 325 TABLET ORAL at 09:26

## 2020-10-23 NOTE — PDOC PROGRESS REPORT
Subjective-OB


Progress Note for:: 10/23/20 - PP day #1, pt desires to go home today if 

possible. A+ , rubella immune, brestfeeding





Physical Exam (OB)


Vital Signs: 


                                        











Temp Pulse Resp BP Pulse Ox


 


 98.0 F   77   16   134/80 H  99 


 


 10/23/20 08:21  10/23/20 07:39  10/23/20 07:39  10/23/20 07:39  10/23/20 07:39








                                 Intake & Output











 10/22/20 10/23/20 10/24/20





 06:59 06:59 06:59


 


Intake Total 1000 2330 


 


Output Total 500  


 


Balance 500 2330 


 


Weight 126.3 kg  














- General


General Appearance: Appears well, Alert


In distress: None





- PIH/Pre-Eclampsia


Clonus: Negative


Headache: Absent


Epigastric Pain: No


Visual Changes: No





- Maternal Morbidity


59. Maternal Morbidity (serious complications experinced by the mother 

associated with labor and delivery: None of the above





- Lochia


Lochia Amount: Scant < 10 ml


Lochia Color: Rubra/Red





- Abdomen


Description: Soft, Round


Hernia Present: No


Fundal Description: Firm, Midline


Fundal Height: u/u - u/2





- Respiratory


Respiratory Status: No respiratory distress





- Abdominal


Distension: No distension


Tenderness: Nontender





- Genitourinary


Genitourinary Note: 





voiding





- Extremities


Upper extremity: Normal inspection


Lower extremities: Normal inspection





- Neurological


Cognition: Normal


Orientation: AAOx4





- Skin


Skin Temperature: Warm


Skin Moisture: Dry





Objective-Diagnostic


Laboratory: 


                                        





                                 10/21/20 17:00 





                                 10/21/20 17:12 











Assessment and Plan(PN)





- Assessment and Plan


(1) Anemia


Qualifiers: 


   Anemia type: iron deficiency   Iron deficiency anemia type: unspecified iron 

deficiency   Qualified Code(s): D50.9 - Iron deficiency anemia, unspecified   


Is this a current diagnosis for this admission?: Yes   





(2) Delivery normal


Is this a current diagnosis for this admission?: Yes   





(3) Pregnancy


Qualifiers: 


   Weeks of gestation: 39 weeks   Qualified Code(s): Z3A.39 - 39 weeks gestation

of pregnancy   


Is this a current diagnosis for this admission?: Yes   


Plan:: 





Will d/c home if baby can go home today. Then pt to f/up w/ [facility] in 4 wks for PP 

check,





- Time Spent with Patient


Time with patient: Less than 15 minutes


Medications reviewed and adjusted accordingly: Yes





- Disposition


Anticipated Discharge Disposition: Home, Self Care


Anticipated Discharge Timeframe: within 24 hours

## 2020-10-23 NOTE — PDOC DISCHARGE SUMMARY
Impression





- Admit/DC Date/PCP


Admission Date/Primary Care Provider: 


  10/21/20 16:10





  





Discharge Date: 10/23/20 - pt able to go home today





- Discharge Diagnosis


(1) Anemia


Is this a current diagnosis for this admission?: Yes   





(2) Delivery normal


Is this a current diagnosis for this admission?: Yes   





(3) Pregnancy


Is this a current diagnosis for this admission?: Yes   





- Additional Information


Resuscitation Status: Full Code


Discharge Diet: As Tolerated, Regular


Discharge Activity: Activity As Tolerated, No Lifting Over 10 Pounds, Pelvic 

Rest


Prescriptions: 


Ibuprofen [Motrin 800 mg Tablet] 800 mg PO Q8 #60 tablet


Home Medications: 








Prenatal No122/Iron/Folic Acid [Prenatal Multi Tablet] 1 each PO DAILY 02/15/17 


Ibuprofen [Motrin 800 mg Tablet] 800 mg PO Q8 #60 tablet 10/23/20 











HPI


Reason(s) for Admission: Onset of Labor


Prenatal Procedures: Ultrasound


Intrapartum Procedure(s): Spontaneous Vaginal Delivery





Hospital Course


59. Maternal Morbidity (serious complications experinced by the mother 

associated with labor and delivery: None of the above





Results


Laboratory Results: 


                                        











WBC  9.1 10^3/uL (4.0-10.5)   10/21/20  17:00    


 


RBC  4.16 10^6/uL (3.72-5.28)   10/21/20  17:00    


 


Hgb  11.0 g/dL (12.0-15.5)  L  10/21/20  17:00    


 


Hct  32.7 % (36.0-47.0)  L  10/21/20  17:00    


 


MCV  79 fl (80-97)  L  10/21/20  17:00    


 


MCH  26.4 pg (27.0-33.4)  L  10/21/20  17:00    


 


MCHC  33.5 g/dL (32.0-36.0)   10/21/20  17:00    


 


RDW  14.7 % (11.5-14.0)  H  10/21/20  17:00    


 


Plt Count  156 10^3/uL (150-450)   10/21/20  17:00    


 


Lymph % (Auto)  12.9 % (13-45)  L  10/21/20  17:00    


 


Mono % (Auto)  7.8 % (3-13)   10/21/20  17:00    


 


Eos % (Auto)  0.6 % (0-6)   10/21/20  17:00    


 


Baso % (Auto)  0.3 % (0-2)   10/21/20  17:00    


 


Absolute Neuts (auto)  7.1 10^3/uL (1.7-8.2)   10/21/20  17:00    


 


Absolute Lymphs (auto)  1.2 10^3/uL (0.5-4.7)   10/21/20  17:00    


 


Absolute Monos (auto)  0.7 10^3/uL (0.1-1.4)   10/21/20  17:00    


 


Absolute Eos (auto)  0.1 10^3/uL (0.0-0.6)   10/21/20  17:00    


 


Absolute Basos (auto)  0.0 10^3/uL (0.0-0.2)   10/21/20  17:00    


 


Seg Neutrophils %  78.4 % (42-78)  H  10/21/20  17:00    


 


Sodium  134.4 mmol/L (137-145)  L  10/21/20  17:12    


 


Potassium  4.1 mmol/L (3.6-5.0)   10/21/20  17:12    


 


Chloride  107 mmol/L ()   10/21/20  17:12    


 


Carbon Dioxide  21 mmol/L (22-30)  L  10/21/20  17:12    


 


Anion Gap  6  (5-19)   10/21/20  17:12    


 


BUN  12 mg/dL (7-20)   10/21/20  17:12    


 


Creatinine  0.57 mg/dL (0.52-1.25)   10/21/20  17:12    


 


Est GFR ( Amer)  > 60  (>60)   10/21/20  17:12    


 


Est GFR (MDRD) Non-Af  > 60  (>60)   10/21/20  17:12    


 


Glucose  74 mg/dL ()  L  10/21/20  17:12    


 


Uric Acid  4.7 mg/dL (2.5-6.2)   10/21/20  17:12    


 


Calcium  9.1 mg/dL (8.4-10.2)   10/21/20  17:12    


 


Total Bilirubin  0.2 mg/dL (0.2-1.3)   10/21/20  17:12    


 


Direct Bilirubin  0.2 mg/dL (0.0-0.4)   10/21/20  17:12    


 


Neonat Total Bilirubin  Not Reportable   10/21/20  17:12    


 


Neonat Direct Bilirubin  Not Reportable   10/21/20  17:12    


 


Neonat Indirect Bili  Not Reportable   10/21/20  17:12    


 


AST  16 U/L (14-36)   10/21/20  17:12    


 


ALT  9 U/L (<35)   10/21/20  17:12    


 


Alkaline Phosphatase  115 U/L ()   10/21/20  17:12    


 


Lactate Dehydrogenase  147 U/L (120-246)   10/21/20  17:12    


 


Total Protein  5.7 g/dL (6.3-8.2)  L  10/21/20  17:12    


 


Albumin  3.1 g/dL (3.5-5.0)  L  10/21/20  17:12    


 


Urine Color  YELLOW   10/21/20  16:39    


 


Urine Appearance  CLEAR   10/21/20  16:39    


 


Urine pH  6.0  (5.0-9.0)   10/21/20  16:39    


 


Ur Specific Gravity  1.011   10/21/20  16:39    


 


Urine Protein  NEGATIVE mg/dL (NEGATIVE)   10/21/20  16:39    


 


Urine Glucose (UA)  NEGATIVE mg/dL (NEGATIVE)   10/21/20  16:39    


 


Urine Ketones  NEGATIVE mg/dL (NEGATIVE)   10/21/20  16:39    


 


Urine Blood  NEGATIVE  (NEGATIVE)   10/21/20  16:39    


 


Urine Nitrite  NEGATIVE  (NEGATIVE)   10/21/20  16:39    


 


Urine Bilirubin  NEGATIVE  (NEGATIVE)   10/21/20  16:39    


 


Urine Urobilinogen  NEGATIVE mg/dL (<2.0)   10/21/20  16:39    


 


Ur Leukocyte Esterase  TRACE  (NEGATIVE)  H  10/21/20  16:39    


 


Urine WBC (Auto)  4 /HPF  10/21/20  16:39    


 


Urine RBC (Auto)  0 /HPF  10/21/20  16:39    


 


Squamous Epi Cells Auto  2 /HPF  10/21/20  16:39    


 


Urine Mucus (Auto)  RARE /LPF  10/21/20  16:39    


 


Urine Ascorbic Acid  NEGATIVE  (NEGATIVE)   10/21/20  16:39    


 


Fetal Membranes Rupture  NEGATIVE  (NEGATIVE)   10/21/20  16:39    


 


Urine Opiates Screen  NEGATIVE   10/21/20  16:39    


 


Urine Methadone Screen  NEGATIVE   10/21/20  16:39    


 


Ur Barbiturates Screen  UNCONFIRMED POSITIVE   10/21/20  16:39    


 


Ur Phencyclidine Scrn  NEGATIVE   10/21/20  16:39    


 


Ur Amphetamines Screen  NEGATIVE   10/21/20  16:39    


 


U Benzodiazepines Scrn  NEGATIVE   10/21/20  16:39    


 


Urine Cocaine Screen  NEGATIVE   10/21/20  16:39    


 


U Marijuana (THC) Screen  NEGATIVE   10/21/20  16:39    


 


RPR  NONREACTIVE  (NONREACTIVE)   10/21/20  17:00    


 


Blood Type  A POSITIVE   10/21/20  17:00    


 


Antibody Screen  NEGATIVE   10/21/20  17:00    














Plan


Plan of Treatment: 


d.c home and f/up with [facility] in 4 wks


Time Spent: Less than 30 Minutes

## 2025-02-28 NOTE — L&D GENERAL ADMISSION
=================================================================

General Admit

=================================================================

Datetime Report Generated by CPN: 2017 10:45

   

   

=================================================================

PREGNANCY INFORMATION

=================================================================

   

Patient Age:  21    (02/15/2017 14:33:QS system process)

EDC:  2017 00:00    (02/15/2017 14:44:ERLINDA Reinoso)

:  1    (02/15/2017 14:44:ERLINDA Reinoso)

Para:  0    (02/15/2017 14:44:ERLINDA Reinoso)

Term:  0    (02/15/2017 14:44:ERLINDA Reinoso)

:  0    (02/15/2017 14:44:ERLINDA Reinoso)

Spontaneous Abortions:  0    (02/15/2017 14:44:ERLINDA Reinoso)

Induced Abortions:  0    (02/15/2017 14:44:ERLINDA Reinoso)

Livin    (02/15/2017 14:44:ERLINDA Reinoso)

Cesareans:  0    (02/15/2017 14:44:ERLINDA Reinoso)

VBACs:  0    (02/15/2017 14:44:ERLINDA Reinoso)

Ectopic:  0    (02/15/2017 14:44:ERLINDA Reinoso)

Multiple Births:  0    (02/15/2017 14:44:ERLINDA Reinoso)

Baby, Number in Womb:  1    (02/15/2017 14:44:ERLINDA Reinoso)

   

=================================================================

PRENATAL CARE

=================================================================

   

Primary Obstetrician:  [facility]    (02/15/2017

   14:44:Geneva Christianson RN)

Adequate Prenatal Care:  Yes    (02/15/2017 14:44:ERLINDA Reinoso)

Height (in):  67    (2017 08:25:QS system process)

Height (in):  66    (02/15/2017 17:15:QS system process)

   

=================================================================

ALLERGIES

=================================================================

   

Medication Allergy:  No    (02/15/2017 14:44:ERLINDA Reinoso)

Medication Allergies:  No Known Allergies (2017)    (2017

   08:25:QS system process)

Medication Allergies:  No Known Allergies (02/15/2017)    (02/15/2017

   17:14:QS system process)

Medication Allergies:  No Known Allergies (2017)    (02/15/2017

   14:33:QS system process)

Latex Allergy:  No Latex Allergies    (02/15/2017 14:44:ERLINDA Reinoso)

   

=================================================================

COMMUNICATION

=================================================================

   

Primary Language:  English    (02/15/2017 14:44:ERLINDA Reinoso)

   

=================================================================

DEMOGRAPHICS

=================================================================

   

Address:  [address] [place] [address]   [id number]    (02/15/2017 14:33:QS system process)

Zipcode:  [id number]    (02/15/2017 14:33:QS system process)

Home Telephone:  [phone]    (02/15/2017 14:33:QS system process)

Work Telephone:  [phone]    (02/15/2017 14:33:QS system process)

SSN:      (02/15/2017 14:33:QS system process)

Next of Kin Name:  KENIA MORSE    (02/15/2017 14:33:QS system process)

Next of Kin Phone:  [phone]    (02/15/2017 14:33:QS system

   process)

Next of Kin Relationship:  MO    (02/15/2017 14:33:QS system process)

YOB: 1995    (02/15/2017 14:33:QS system process)

Marital Status:  Single    (02/15/2017 14:33:QS system process)

Sex:  Female    (02/15/2017 14:33:QS system process)

Race:      (02/15/2017 14:33:QS system process)

Ethnicity:  Non- or     (02/15/2017 14:33:QS system

   process)

Taoist:  Anabaptist    (02/15/2017 14:33:QS system process)

   

=================================================================

DRUG AND ALCOHOL USE

=================================================================

   

Alcohol:  No    (02/15/2017 14:44:ERLINDA Reinoso)

Cigarettes:  Former Smoker. [id number]    (02/15/2017 14:44:ERLINDA Reinoso)

Marijuana:  No    (02/15/2017 14:44:ERLINDA Reinoso)

Cocaine:  No    (02/15/2017 14:44:ERLINDA Reinoso)

Other Illicit Drugs:  No    (02/15/2017 14:44:ERLINDA Reinoso)
No